# Patient Record
Sex: FEMALE | Race: WHITE | NOT HISPANIC OR LATINO | URBAN - METROPOLITAN AREA
[De-identification: names, ages, dates, MRNs, and addresses within clinical notes are randomized per-mention and may not be internally consistent; named-entity substitution may affect disease eponyms.]

---

## 2017-09-14 ENCOUNTER — OFFICE (AMBULATORY)
Dept: URBAN - METROPOLITAN AREA CLINIC 64 | Facility: CLINIC | Age: 32
End: 2017-09-14

## 2017-09-14 VITALS
HEIGHT: 69 IN | WEIGHT: 232 LBS | DIASTOLIC BLOOD PRESSURE: 86 MMHG | HEART RATE: 109 BPM | SYSTOLIC BLOOD PRESSURE: 121 MMHG

## 2017-09-14 DIAGNOSIS — R10.13 EPIGASTRIC PAIN: ICD-10-CM

## 2017-09-14 DIAGNOSIS — R10.12 LEFT UPPER QUADRANT PAIN: ICD-10-CM

## 2017-09-14 PROCEDURE — 99213 OFFICE O/P EST LOW 20 MIN: CPT

## 2017-10-04 ENCOUNTER — OFFICE (AMBULATORY)
Dept: URBAN - METROPOLITAN AREA CLINIC 64 | Facility: CLINIC | Age: 32
End: 2017-10-04
Payer: COMMERCIAL

## 2017-10-04 ENCOUNTER — ON CAMPUS - OUTPATIENT (AMBULATORY)
Dept: URBAN - METROPOLITAN AREA HOSPITAL 2 | Facility: HOSPITAL | Age: 32
End: 2017-10-04
Payer: COMMERCIAL

## 2017-10-04 VITALS
SYSTOLIC BLOOD PRESSURE: 124 MMHG | OXYGEN SATURATION: 94 % | SYSTOLIC BLOOD PRESSURE: 121 MMHG | OXYGEN SATURATION: 100 % | DIASTOLIC BLOOD PRESSURE: 55 MMHG | TEMPERATURE: 98.1 F | HEART RATE: 107 BPM | HEIGHT: 69 IN | HEART RATE: 86 BPM | DIASTOLIC BLOOD PRESSURE: 54 MMHG | SYSTOLIC BLOOD PRESSURE: 145 MMHG | DIASTOLIC BLOOD PRESSURE: 68 MMHG | OXYGEN SATURATION: 93 % | WEIGHT: 235 LBS | DIASTOLIC BLOOD PRESSURE: 84 MMHG | SYSTOLIC BLOOD PRESSURE: 118 MMHG | RESPIRATION RATE: 16 BRPM | HEART RATE: 89 BPM | OXYGEN SATURATION: 95 % | HEART RATE: 87 BPM

## 2017-10-04 DIAGNOSIS — K29.70 GASTRITIS, UNSPECIFIED, WITHOUT BLEEDING: ICD-10-CM

## 2017-10-04 DIAGNOSIS — K29.50 UNSPECIFIED CHRONIC GASTRITIS WITHOUT BLEEDING: ICD-10-CM

## 2017-10-04 DIAGNOSIS — R10.13 EPIGASTRIC PAIN: ICD-10-CM

## 2017-10-04 DIAGNOSIS — R10.12 LEFT UPPER QUADRANT PAIN: ICD-10-CM

## 2017-10-04 LAB
GI HISTOLOGY: A. SELECT: (no result)
GI HISTOLOGY: PDF REPORT: (no result)

## 2017-10-04 PROCEDURE — 43239 EGD BIOPSY SINGLE/MULTIPLE: CPT

## 2017-10-04 PROCEDURE — 88305 TISSUE EXAM BY PATHOLOGIST: CPT

## 2017-10-04 RX ADMIN — PROPOFOL: 10 INJECTION, EMULSION INTRAVENOUS at 13:43

## 2018-04-12 ENCOUNTER — OFFICE (AMBULATORY)
Dept: URBAN - METROPOLITAN AREA CLINIC 64 | Facility: CLINIC | Age: 33
End: 2018-04-12

## 2018-04-12 VITALS
HEIGHT: 69 IN | HEART RATE: 98 BPM | WEIGHT: 232 LBS | DIASTOLIC BLOOD PRESSURE: 81 MMHG | SYSTOLIC BLOOD PRESSURE: 115 MMHG

## 2018-04-12 DIAGNOSIS — R10.84 GENERALIZED ABDOMINAL PAIN: ICD-10-CM

## 2018-04-12 DIAGNOSIS — K59.00 CONSTIPATION, UNSPECIFIED: ICD-10-CM

## 2018-04-12 DIAGNOSIS — R19.4 CHANGE IN BOWEL HABIT: ICD-10-CM

## 2018-04-12 DIAGNOSIS — K62.5 HEMORRHAGE OF ANUS AND RECTUM: ICD-10-CM

## 2018-04-12 DIAGNOSIS — R11.2 NAUSEA WITH VOMITING, UNSPECIFIED: ICD-10-CM

## 2018-04-12 PROCEDURE — 99213 OFFICE O/P EST LOW 20 MIN: CPT | Performed by: NURSE PRACTITIONER

## 2018-04-12 RX ORDER — HYDROCORTISONE 25 MG/G
7.5 CREAM TOPICAL
Qty: 30 | Refills: 1 | Status: ACTIVE
Start: 2018-04-12

## 2019-04-12 ENCOUNTER — ON CAMPUS - OUTPATIENT (AMBULATORY)
Dept: URBAN - METROPOLITAN AREA HOSPITAL 77 | Facility: HOSPITAL | Age: 34
End: 2019-04-12

## 2019-04-12 DIAGNOSIS — R10.10 UPPER ABDOMINAL PAIN, UNSPECIFIED: ICD-10-CM

## 2019-04-12 DIAGNOSIS — D64.89 OTHER SPECIFIED ANEMIAS: ICD-10-CM

## 2019-04-12 DIAGNOSIS — R73.9 HYPERGLYCEMIA, UNSPECIFIED: ICD-10-CM

## 2019-04-12 DIAGNOSIS — R11.2 NAUSEA WITH VOMITING, UNSPECIFIED: ICD-10-CM

## 2019-04-12 DIAGNOSIS — K30 FUNCTIONAL DYSPEPSIA: ICD-10-CM

## 2019-04-12 DIAGNOSIS — K29.70 GASTRITIS, UNSPECIFIED, WITHOUT BLEEDING: ICD-10-CM

## 2019-04-12 PROCEDURE — 43239 EGD BIOPSY SINGLE/MULTIPLE: CPT | Performed by: INTERNAL MEDICINE

## 2019-04-12 PROCEDURE — 99213 OFFICE O/P EST LOW 20 MIN: CPT | Mod: 25 | Performed by: NURSE PRACTITIONER

## 2019-04-13 ENCOUNTER — ON CAMPUS - OUTPATIENT (AMBULATORY)
Dept: URBAN - METROPOLITAN AREA HOSPITAL 77 | Facility: HOSPITAL | Age: 34
End: 2019-04-13

## 2019-04-13 DIAGNOSIS — R73.9 HYPERGLYCEMIA, UNSPECIFIED: ICD-10-CM

## 2019-04-13 DIAGNOSIS — K30 FUNCTIONAL DYSPEPSIA: ICD-10-CM

## 2019-04-13 DIAGNOSIS — R11.2 NAUSEA WITH VOMITING, UNSPECIFIED: ICD-10-CM

## 2019-04-13 DIAGNOSIS — R10.10 UPPER ABDOMINAL PAIN, UNSPECIFIED: ICD-10-CM

## 2019-04-13 DIAGNOSIS — K29.70 GASTRITIS, UNSPECIFIED, WITHOUT BLEEDING: ICD-10-CM

## 2019-04-13 DIAGNOSIS — D64.89 OTHER SPECIFIED ANEMIAS: ICD-10-CM

## 2019-04-13 PROCEDURE — 99213 OFFICE O/P EST LOW 20 MIN: CPT | Performed by: NURSE PRACTITIONER

## 2019-06-17 ENCOUNTER — HOSPITAL ENCOUNTER (EMERGENCY)
Facility: HOSPITAL | Age: 34
Discharge: HOME | End: 2019-06-17
Attending: EMERGENCY MEDICINE | Admitting: EMERGENCY MEDICINE

## 2019-06-17 VITALS
WEIGHT: 225 LBS | HEART RATE: 88 BPM | TEMPERATURE: 98.3 F | HEIGHT: 69 IN | DIASTOLIC BLOOD PRESSURE: 87 MMHG | OXYGEN SATURATION: 100 % | BODY MASS INDEX: 33.33 KG/M2 | RESPIRATION RATE: 16 BRPM | SYSTOLIC BLOOD PRESSURE: 133 MMHG

## 2019-06-17 DIAGNOSIS — R00.2 PALPITATIONS: ICD-10-CM

## 2019-06-17 DIAGNOSIS — R10.33 PERIUMBILICAL ABDOMINAL PAIN: Primary | ICD-10-CM

## 2019-06-17 DIAGNOSIS — F41.8 ANXIETY ABOUT HEALTH: ICD-10-CM

## 2019-06-17 DIAGNOSIS — K50.90 CROHN'S DISEASE WITHOUT COMPLICATION, UNSPECIFIED GASTROINTESTINAL TRACT LOCATION (HCC): ICD-10-CM

## 2019-06-17 LAB
ALBUMIN SERPL-MCNC: 3.5 G/DL (ref 3.5–4.8)
ALBUMIN/GLOB SERPL: 1.1 G/DL (ref 1–1.7)
ALP SERPL-CCNC: 71 U/L (ref 32–91)
ALT SERPL W P-5'-P-CCNC: 25 U/L (ref 14–54)
ANION GAP SERPL CALCULATED.3IONS-SCNC: 9 MMOL/L (ref 10–20)
AST SERPL-CCNC: 36 U/L (ref 15–41)
B-HCG UR QL: NEGATIVE
BASOPHILS # BLD AUTO: 0.1 10*3/MM3 (ref 0–0.2)
BASOPHILS NFR BLD AUTO: 1 % (ref 0–1.5)
BILIRUB SERPL-MCNC: 0.3 MG/DL (ref 0.3–1.2)
BUN SERPL-MCNC: 8 MG/DL (ref 8–20)
BUN/CREAT SERPL: 11.4 (ref 5.4–26.2)
CALCIUM SPEC-SCNC: 9.2 MG/DL (ref 8.9–10.3)
CHLORIDE SERPL-SCNC: 111 MMOL/L (ref 101–111)
CO2 SERPL-SCNC: 18 MMOL/L (ref 22–32)
CREAT SERPL-MCNC: 0.7 MG/DL (ref 0.4–1)
DEPRECATED RDW RBC AUTO: 43.3 FL (ref 37–54)
EOSINOPHIL # BLD AUTO: 0.2 10*3/MM3 (ref 0–0.4)
EOSINOPHIL NFR BLD AUTO: 2.2 % (ref 0.3–6.2)
ERYTHROCYTE [DISTWIDTH] IN BLOOD BY AUTOMATED COUNT: 14.5 % (ref 12.3–15.4)
ERYTHROCYTE [SEDIMENTATION RATE] IN BLOOD: 10 MM/HR (ref 0–20)
GFR SERPL CREATININE-BSD FRML MDRD: 98 ML/MIN/1.73
GLOBULIN UR ELPH-MCNC: 3.2 GM/DL (ref 2.5–3.8)
GLUCOSE SERPL-MCNC: 126 MG/DL (ref 65–99)
HCT VFR BLD AUTO: 33.2 % (ref 34–46.6)
HGB BLD-MCNC: 11.3 G/DL (ref 12–15.9)
LYMPHOCYTES # BLD AUTO: 2.7 10*3/MM3 (ref 0.7–3.1)
LYMPHOCYTES NFR BLD AUTO: 36.3 % (ref 19.6–45.3)
MCH RBC QN AUTO: 29 PG (ref 26.6–33)
MCHC RBC AUTO-ENTMCNC: 34 G/DL (ref 31.5–35.7)
MCV RBC AUTO: 85.4 FL (ref 79–97)
MONOCYTES # BLD AUTO: 0.5 10*3/MM3 (ref 0.1–0.9)
MONOCYTES NFR BLD AUTO: 6.1 % (ref 5–12)
NEUTROPHILS NFR BLD AUTO: 4.1 10*3/MM3 (ref 1.7–7)
NEUTROPHILS NFR BLD AUTO: 54.4 % (ref 42.7–76)
NRBC BLD AUTO-RTO: 0.1 /100 WBC (ref 0–0.2)
PLATELET # BLD AUTO: 370 10*3/MM3 (ref 140–450)
PMV BLD AUTO: 7.5 FL (ref 6–12)
POTASSIUM SERPL-SCNC: 3.8 MMOL/L (ref 3.6–5.1)
PROT SERPL-MCNC: 6.7 G/DL (ref 6.1–7.9)
RBC # BLD AUTO: 3.89 10*6/MM3 (ref 3.77–5.28)
SODIUM SERPL-SCNC: 138 MMOL/L (ref 136–144)
TROPONIN I SERPL-MCNC: <0.03 NG/ML (ref 0–0.03)
TSH SERPL DL<=0.05 MIU/L-ACNC: 0.89 MIU/ML (ref 0.34–5.6)
WBC NRBC COR # BLD: 7.5 10*3/MM3 (ref 3.4–10.8)

## 2019-06-17 PROCEDURE — 81025 URINE PREGNANCY TEST: CPT | Performed by: EMERGENCY MEDICINE

## 2019-06-17 PROCEDURE — 85652 RBC SED RATE AUTOMATED: CPT | Performed by: EMERGENCY MEDICINE

## 2019-06-17 PROCEDURE — 85025 COMPLETE CBC W/AUTO DIFF WBC: CPT | Performed by: EMERGENCY MEDICINE

## 2019-06-17 PROCEDURE — 93005 ELECTROCARDIOGRAM TRACING: CPT | Performed by: EMERGENCY MEDICINE

## 2019-06-17 PROCEDURE — 96375 TX/PRO/DX INJ NEW DRUG ADDON: CPT

## 2019-06-17 PROCEDURE — 99284 EMERGENCY DEPT VISIT MOD MDM: CPT

## 2019-06-17 PROCEDURE — 84484 ASSAY OF TROPONIN QUANT: CPT | Performed by: EMERGENCY MEDICINE

## 2019-06-17 PROCEDURE — 25010000002 DIPHENHYDRAMINE PER 50 MG: Performed by: EMERGENCY MEDICINE

## 2019-06-17 PROCEDURE — 96374 THER/PROPH/DIAG INJ IV PUSH: CPT

## 2019-06-17 PROCEDURE — 25010000002 HALOPERIDOL LACTATE PER 5 MG: Performed by: EMERGENCY MEDICINE

## 2019-06-17 PROCEDURE — 80053 COMPREHEN METABOLIC PANEL: CPT | Performed by: EMERGENCY MEDICINE

## 2019-06-17 PROCEDURE — 84443 ASSAY THYROID STIM HORMONE: CPT | Performed by: EMERGENCY MEDICINE

## 2019-06-17 RX ORDER — PANTOPRAZOLE SODIUM 40 MG/1
40 TABLET, DELAYED RELEASE ORAL DAILY
COMMUNITY

## 2019-06-17 RX ORDER — GABAPENTIN 800 MG/1
800 TABLET ORAL 3 TIMES DAILY
COMMUNITY
End: 2020-11-30

## 2019-06-17 RX ORDER — DIPHENHYDRAMINE HYDROCHLORIDE 50 MG/ML
25 INJECTION INTRAMUSCULAR; INTRAVENOUS ONCE
Status: COMPLETED | OUTPATIENT
Start: 2019-06-17 | End: 2019-06-17

## 2019-06-17 RX ORDER — ALPRAZOLAM 0.5 MG/1
0.5 TABLET ORAL 3 TIMES DAILY PRN
COMMUNITY

## 2019-06-17 RX ORDER — TOPIRAMATE 50 MG/1
50 TABLET, FILM COATED ORAL 2 TIMES DAILY
COMMUNITY
End: 2020-11-30 | Stop reason: SDUPTHER

## 2019-06-17 RX ORDER — HALOPERIDOL 5 MG/ML
1 INJECTION INTRAMUSCULAR ONCE
Status: COMPLETED | OUTPATIENT
Start: 2019-06-17 | End: 2019-06-17

## 2019-06-17 RX ORDER — LEVETIRACETAM 750 MG/1
750 TABLET ORAL 3 TIMES DAILY
COMMUNITY
End: 2020-11-30 | Stop reason: SDUPTHER

## 2019-06-17 RX ORDER — ONDANSETRON 4 MG/1
4 TABLET, FILM COATED ORAL
COMMUNITY
End: 2020-11-30 | Stop reason: SDUPTHER

## 2019-06-17 RX ORDER — METOPROLOL TARTRATE 100 MG/1
100 TABLET ORAL 3 TIMES DAILY
COMMUNITY

## 2019-06-17 RX ORDER — CYCLOBENZAPRINE HCL 10 MG
10 TABLET ORAL 2 TIMES DAILY PRN
COMMUNITY

## 2019-06-17 RX ORDER — DILTIAZEM HYDROCHLORIDE 60 MG/1
120 TABLET, FILM COATED ORAL 2 TIMES DAILY
COMMUNITY

## 2019-06-17 RX ORDER — DULOXETIN HYDROCHLORIDE 60 MG/1
60 CAPSULE, DELAYED RELEASE ORAL 2 TIMES DAILY
COMMUNITY

## 2019-06-17 RX ORDER — BUSPIRONE HYDROCHLORIDE 5 MG/1
5 TABLET ORAL 2 TIMES DAILY
Qty: 60 TABLET | Refills: 0 | Status: SHIPPED | OUTPATIENT
Start: 2019-06-17 | End: 2020-11-30

## 2019-06-17 RX ADMIN — HALOPERIDOL LACTATE 1 MG: 5 INJECTION INTRAMUSCULAR at 10:01

## 2019-06-17 RX ADMIN — SODIUM CHLORIDE 1000 ML: 900 INJECTION, SOLUTION INTRAVENOUS at 10:01

## 2019-06-17 RX ADMIN — DIPHENHYDRAMINE HYDROCHLORIDE 25 MG: 50 INJECTION, SOLUTION INTRAMUSCULAR; INTRAVENOUS at 10:00

## 2019-12-29 ENCOUNTER — HOSPITAL ENCOUNTER (EMERGENCY)
Facility: HOSPITAL | Age: 34
Discharge: HOME | End: 2019-12-29
Admitting: EMERGENCY MEDICINE

## 2019-12-29 ENCOUNTER — APPOINTMENT (OUTPATIENT)
Dept: GENERAL RADIOLOGY | Facility: HOSPITAL | Age: 34
End: 2019-12-29

## 2019-12-29 ENCOUNTER — LAB REQUISITION (OUTPATIENT)
Dept: LAB | Facility: HOSPITAL | Age: 34
End: 2019-12-29

## 2019-12-29 VITALS
OXYGEN SATURATION: 98 % | WEIGHT: 238.54 LBS | HEART RATE: 92 BPM | DIASTOLIC BLOOD PRESSURE: 74 MMHG | SYSTOLIC BLOOD PRESSURE: 110 MMHG | BODY MASS INDEX: 35.33 KG/M2 | HEIGHT: 69 IN | RESPIRATION RATE: 16 BRPM | TEMPERATURE: 98.1 F

## 2019-12-29 DIAGNOSIS — S93.402A SPRAIN OF LEFT ANKLE, UNSPECIFIED LIGAMENT, INITIAL ENCOUNTER: ICD-10-CM
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
## 2019-12-29 DIAGNOSIS — S86.912A KNEE STRAIN, LEFT, INITIAL ENCOUNTER: ICD-10-CM

## 2019-12-29 DIAGNOSIS — Z04.2 ENCOUNTER FOR EXAMINATION AND OBSERVATION FOLLOWING WORK ACCIDENT: ICD-10-CM

## 2019-12-29 LAB
AMPHET+METHAMPHET UR QL: NEGATIVE
BARBITURATES UR QL SCN: NEGATIVE
BENZODIAZ UR QL SCN: POSITIVE
CANNABINOIDS SERPL QL: NEGATIVE
COCAINE UR QL: NEGATIVE
METHADONE UR QL SCN: NEGATIVE
OPIATES UR QL: NEGATIVE
OXYCODONE UR QL SCN: NEGATIVE

## 2019-12-29 PROCEDURE — 73630 X-RAY EXAM OF FOOT: CPT

## 2019-12-29 PROCEDURE — 80307 DRUG TEST PRSMV CHEM ANLYZR: CPT

## 2019-12-29 PROCEDURE — 73610 X-RAY EXAM OF ANKLE: CPT

## 2019-12-29 PROCEDURE — 73562 X-RAY EXAM OF KNEE 3: CPT

## 2019-12-29 PROCEDURE — 82570 ASSAY OF URINE CREATININE: CPT

## 2019-12-29 PROCEDURE — 99283 EMERGENCY DEPT VISIT LOW MDM: CPT

## 2019-12-29 RX ORDER — HYDROCODONE BITARTRATE AND ACETAMINOPHEN 5; 325 MG/1; MG/1
1 TABLET ORAL ONCE AS NEEDED
Status: DISCONTINUED | OUTPATIENT
Start: 2019-12-29 | End: 2019-12-29 | Stop reason: HOSPADM

## 2019-12-29 RX ADMIN — HYDROCODONE BITARTRATE AND ACETAMINOPHEN 1 TABLET: 5; 325 TABLET ORAL at 12:44

## 2020-11-30 ENCOUNTER — OFFICE VISIT (OUTPATIENT)
Dept: CARDIOLOGY | Facility: CLINIC | Age: 35
End: 2020-11-30

## 2020-11-30 VITALS
HEIGHT: 69 IN | SYSTOLIC BLOOD PRESSURE: 115 MMHG | OXYGEN SATURATION: 94 % | BODY MASS INDEX: 34.8 KG/M2 | DIASTOLIC BLOOD PRESSURE: 74 MMHG | WEIGHT: 235 LBS | HEART RATE: 82 BPM | TEMPERATURE: 96.9 F

## 2020-11-30 DIAGNOSIS — R07.2 PRECORDIAL PAIN: Primary | ICD-10-CM

## 2020-11-30 DIAGNOSIS — Z82.49 FAMILY HISTORY OF PREMATURE CAD: ICD-10-CM

## 2020-11-30 DIAGNOSIS — R06.09 DYSPNEA ON EXERTION: ICD-10-CM

## 2020-11-30 DIAGNOSIS — I10 ESSENTIAL HYPERTENSION: ICD-10-CM

## 2020-11-30 DIAGNOSIS — R42 DIZZINESS: ICD-10-CM

## 2020-11-30 DIAGNOSIS — R00.2 PALPITATIONS: ICD-10-CM

## 2020-11-30 DIAGNOSIS — E11.9 TYPE 2 DIABETES MELLITUS WITHOUT COMPLICATION, WITHOUT LONG-TERM CURRENT USE OF INSULIN (HCC): ICD-10-CM

## 2020-11-30 DIAGNOSIS — R60.0 EDEMA LEG: ICD-10-CM

## 2020-11-30 PROCEDURE — 99204 OFFICE O/P NEW MOD 45 MIN: CPT | Performed by: INTERNAL MEDICINE

## 2020-11-30 PROCEDURE — 93000 ELECTROCARDIOGRAM COMPLETE: CPT | Performed by: INTERNAL MEDICINE

## 2020-11-30 RX ORDER — SUMATRIPTAN 100 MG/1
TABLET, FILM COATED ORAL
COMMUNITY
Start: 2020-11-18

## 2020-11-30 RX ORDER — ONDANSETRON 8 MG/1
TABLET, ORALLY DISINTEGRATING ORAL
COMMUNITY
Start: 2020-11-10 | End: 2020-11-30 | Stop reason: SDUPTHER

## 2020-11-30 RX ORDER — FUROSEMIDE 40 MG/1
TABLET ORAL
COMMUNITY
Start: 2020-10-16

## 2020-11-30 RX ORDER — BUPROPION HYDROCHLORIDE 300 MG/1
300 TABLET ORAL DAILY
COMMUNITY

## 2020-11-30 RX ORDER — ONDANSETRON HYDROCHLORIDE 8 MG/1
TABLET, FILM COATED ORAL AS NEEDED
COMMUNITY
Start: 2020-09-08

## 2020-11-30 RX ORDER — MONTELUKAST SODIUM 10 MG/1
TABLET ORAL
COMMUNITY
Start: 2020-11-18

## 2020-11-30 RX ORDER — LEVETIRACETAM 500 MG/1
TABLET ORAL 3 TIMES DAILY
COMMUNITY
Start: 2020-11-10

## 2020-11-30 RX ORDER — MESALAMINE 1.2 G/1
TABLET, DELAYED RELEASE ORAL
COMMUNITY
Start: 2020-09-11

## 2020-11-30 RX ORDER — GABAPENTIN 300 MG/1
300 CAPSULE ORAL 2 TIMES DAILY
COMMUNITY
Start: 2020-11-10

## 2020-11-30 RX ORDER — CETIRIZINE HYDROCHLORIDE 10 MG/1
TABLET ORAL
COMMUNITY
Start: 2020-11-18

## 2020-11-30 RX ORDER — LAMOTRIGINE 100 MG/1
TABLET ORAL
COMMUNITY
Start: 2020-11-18

## 2020-11-30 RX ORDER — PROMETHAZINE HYDROCHLORIDE 25 MG/1
SUPPOSITORY RECTAL
COMMUNITY
Start: 2020-09-15

## 2023-07-23 ENCOUNTER — APPOINTMENT (OUTPATIENT)
Dept: GENERAL RADIOLOGY | Facility: HOSPITAL | Age: 38
End: 2023-07-23
Payer: COMMERCIAL

## 2023-07-23 ENCOUNTER — APPOINTMENT (OUTPATIENT)
Dept: CARDIOLOGY | Facility: HOSPITAL | Age: 38
End: 2023-07-23
Payer: COMMERCIAL

## 2023-07-23 ENCOUNTER — HOSPITAL ENCOUNTER (OUTPATIENT)
Facility: HOSPITAL | Age: 38
Setting detail: OBSERVATION
Discharge: HOME | End: 2023-07-24
Attending: EMERGENCY MEDICINE | Admitting: EMERGENCY MEDICINE
Payer: COMMERCIAL

## 2023-07-23 DIAGNOSIS — R00.2 PALPITATIONS: ICD-10-CM

## 2023-07-23 DIAGNOSIS — R07.9 CHEST PAIN, UNSPECIFIED TYPE: Primary | ICD-10-CM

## 2023-07-23 LAB
ALBUMIN SERPL-MCNC: 4.3 G/DL (ref 3.5–5.2)
ALBUMIN/GLOB SERPL: 1.6 G/DL
ALP SERPL-CCNC: 143 U/L (ref 39–117)
ALT SERPL W P-5'-P-CCNC: 24 U/L (ref 1–33)
ANION GAP SERPL CALCULATED.3IONS-SCNC: 12 MMOL/L (ref 5–15)
AST SERPL-CCNC: 18 U/L (ref 1–32)
BASOPHILS # BLD AUTO: 0.1 10*3/MM3 (ref 0–0.2)
BASOPHILS NFR BLD AUTO: 0.7 % (ref 0–1.5)
BILIRUB SERPL-MCNC: <0.2 MG/DL (ref 0–1.2)
BUN SERPL-MCNC: 11 MG/DL (ref 6–20)
BUN/CREAT SERPL: 14.9 (ref 7–25)
CALCIUM SPEC-SCNC: 9.6 MG/DL (ref 8.6–10.5)
CHLORIDE SERPL-SCNC: 107 MMOL/L (ref 98–107)
CO2 SERPL-SCNC: 23 MMOL/L (ref 22–29)
CREAT SERPL-MCNC: 0.74 MG/DL (ref 0.57–1)
CRP SERPL-MCNC: 1.38 MG/DL (ref 0–0.5)
D DIMER PPP FEU-MCNC: 0.3 MG/L (FEU) (ref 0–0.5)
DEPRECATED RDW RBC AUTO: 45.1 FL (ref 37–54)
EGFRCR SERPLBLD CKD-EPI 2021: 107.7 ML/MIN/1.73
EOSINOPHIL # BLD AUTO: 0.3 10*3/MM3 (ref 0–0.4)
EOSINOPHIL NFR BLD AUTO: 2.8 % (ref 0.3–6.2)
ERYTHROCYTE [DISTWIDTH] IN BLOOD BY AUTOMATED COUNT: 14.8 % (ref 12.3–15.4)
GEN 5 2HR TROPONIN T REFLEX: 12 NG/L
GLOBULIN UR ELPH-MCNC: 2.7 GM/DL
GLUCOSE SERPL-MCNC: 212 MG/DL (ref 65–99)
HCT VFR BLD AUTO: 39.7 % (ref 34–46.6)
HGB BLD-MCNC: 13.1 G/DL (ref 12–15.9)
LYMPHOCYTES # BLD AUTO: 2.4 10*3/MM3 (ref 0.7–3.1)
LYMPHOCYTES NFR BLD AUTO: 25.1 % (ref 19.6–45.3)
MCH RBC QN AUTO: 28.8 PG (ref 26.6–33)
MCHC RBC AUTO-ENTMCNC: 33 G/DL (ref 31.5–35.7)
MCV RBC AUTO: 87.4 FL (ref 79–97)
MONOCYTES # BLD AUTO: 0.4 10*3/MM3 (ref 0.1–0.9)
MONOCYTES NFR BLD AUTO: 4.2 % (ref 5–12)
NEUTROPHILS NFR BLD AUTO: 6.5 10*3/MM3 (ref 1.7–7)
NEUTROPHILS NFR BLD AUTO: 67.2 % (ref 42.7–76)
NRBC BLD AUTO-RTO: 0 /100 WBC (ref 0–0.2)
PLATELET # BLD AUTO: 399 10*3/MM3 (ref 140–450)
PMV BLD AUTO: 7.5 FL (ref 6–12)
POTASSIUM SERPL-SCNC: 3.6 MMOL/L (ref 3.5–5.2)
PROT SERPL-MCNC: 7 G/DL (ref 6–8.5)
RBC # BLD AUTO: 4.55 10*6/MM3 (ref 3.77–5.28)
SODIUM SERPL-SCNC: 142 MMOL/L (ref 136–145)
TROPONIN T DELTA: ABNORMAL
TROPONIN T SERPL HS-MCNC: <6 NG/L
WBC NRBC COR # BLD: 9.7 10*3/MM3 (ref 3.4–10.8)

## 2023-07-23 PROCEDURE — 86140 C-REACTIVE PROTEIN: CPT | Performed by: NURSE PRACTITIONER

## 2023-07-23 PROCEDURE — 80053 COMPREHEN METABOLIC PANEL: CPT | Performed by: EMERGENCY MEDICINE

## 2023-07-23 PROCEDURE — 96374 THER/PROPH/DIAG INJ IV PUSH: CPT

## 2023-07-23 PROCEDURE — 36415 COLL VENOUS BLD VENIPUNCTURE: CPT | Performed by: EMERGENCY MEDICINE

## 2023-07-23 PROCEDURE — 25010000002 ENOXAPARIN PER 10 MG: Performed by: NURSE PRACTITIONER

## 2023-07-23 PROCEDURE — G0378 HOSPITAL OBSERVATION PER HR: HCPCS

## 2023-07-23 PROCEDURE — 85025 COMPLETE CBC W/AUTO DIFF WBC: CPT | Performed by: EMERGENCY MEDICINE

## 2023-07-23 PROCEDURE — 85379 FIBRIN DEGRADATION QUANT: CPT | Performed by: EMERGENCY MEDICINE

## 2023-07-23 PROCEDURE — 63710000001 PROMETHAZINE PER 25 MG: Performed by: EMERGENCY MEDICINE

## 2023-07-23 PROCEDURE — 96375 TX/PRO/DX INJ NEW DRUG ADDON: CPT

## 2023-07-23 PROCEDURE — 93005 ELECTROCARDIOGRAM TRACING: CPT

## 2023-07-23 PROCEDURE — 96376 TX/PRO/DX INJ SAME DRUG ADON: CPT

## 2023-07-23 PROCEDURE — 25010000002 KETOROLAC TROMETHAMINE PER 15 MG: Performed by: NURSE PRACTITIONER

## 2023-07-23 PROCEDURE — 99285 EMERGENCY DEPT VISIT HI MDM: CPT

## 2023-07-23 PROCEDURE — 71045 X-RAY EXAM CHEST 1 VIEW: CPT

## 2023-07-23 PROCEDURE — 25010000002 ONDANSETRON PER 1 MG: Performed by: NURSE PRACTITIONER

## 2023-07-23 PROCEDURE — 99213 OFFICE O/P EST LOW 20 MIN: CPT | Performed by: INTERNAL MEDICINE

## 2023-07-23 PROCEDURE — 36415 COLL VENOUS BLD VENIPUNCTURE: CPT

## 2023-07-23 PROCEDURE — 96372 THER/PROPH/DIAG INJ SC/IM: CPT

## 2023-07-23 PROCEDURE — 63710000001 ONDANSETRON PER 8 MG: Performed by: NURSE PRACTITIONER

## 2023-07-23 PROCEDURE — 25010000002 HYDROMORPHONE 1 MG/ML SOLUTION: Performed by: EMERGENCY MEDICINE

## 2023-07-23 PROCEDURE — 84484 ASSAY OF TROPONIN QUANT: CPT | Performed by: EMERGENCY MEDICINE

## 2023-07-23 PROCEDURE — 25010000002 ONDANSETRON PER 1 MG: Performed by: EMERGENCY MEDICINE

## 2023-07-23 RX ORDER — TRAZODONE HYDROCHLORIDE 100 MG/1
100 TABLET ORAL NIGHTLY
Status: DISCONTINUED | OUTPATIENT
Start: 2023-07-23 | End: 2023-07-24 | Stop reason: HOSPADM

## 2023-07-23 RX ORDER — ENOXAPARIN SODIUM 100 MG/ML
40 INJECTION SUBCUTANEOUS DAILY
Status: DISCONTINUED | OUTPATIENT
Start: 2023-07-23 | End: 2023-07-23

## 2023-07-23 RX ORDER — POLYETHYLENE GLYCOL 3350 17 G/17G
17 POWDER, FOR SOLUTION ORAL DAILY PRN
Status: DISCONTINUED | OUTPATIENT
Start: 2023-07-23 | End: 2023-07-24 | Stop reason: HOSPADM

## 2023-07-23 RX ORDER — ONDANSETRON 2 MG/ML
4 INJECTION INTRAMUSCULAR; INTRAVENOUS EVERY 6 HOURS PRN
Status: DISCONTINUED | OUTPATIENT
Start: 2023-07-23 | End: 2023-07-24 | Stop reason: HOSPADM

## 2023-07-23 RX ORDER — SODIUM CHLORIDE 0.9 % (FLUSH) 0.9 %
10 SYRINGE (ML) INJECTION EVERY 12 HOURS SCHEDULED
Status: DISCONTINUED | OUTPATIENT
Start: 2023-07-23 | End: 2023-07-24 | Stop reason: HOSPADM

## 2023-07-23 RX ORDER — TOPIRAMATE 100 MG/1
200 TABLET, FILM COATED ORAL NIGHTLY
Status: DISCONTINUED | OUTPATIENT
Start: 2023-07-23 | End: 2023-07-24 | Stop reason: HOSPADM

## 2023-07-23 RX ORDER — PANTOPRAZOLE SODIUM 40 MG/1
40 TABLET, DELAYED RELEASE ORAL DAILY
Status: DISCONTINUED | OUTPATIENT
Start: 2023-07-23 | End: 2023-07-24 | Stop reason: HOSPADM

## 2023-07-23 RX ORDER — CETIRIZINE HYDROCHLORIDE 10 MG/1
10 TABLET ORAL DAILY
Status: DISCONTINUED | OUTPATIENT
Start: 2023-07-23 | End: 2023-07-24 | Stop reason: HOSPADM

## 2023-07-23 RX ORDER — METOPROLOL TARTRATE 50 MG/1
100 TABLET, FILM COATED ORAL 3 TIMES DAILY
Status: DISCONTINUED | OUTPATIENT
Start: 2023-07-23 | End: 2023-07-24 | Stop reason: HOSPADM

## 2023-07-23 RX ORDER — GABAPENTIN 300 MG/1
300 CAPSULE ORAL EVERY 12 HOURS SCHEDULED
Status: DISCONTINUED | OUTPATIENT
Start: 2023-07-23 | End: 2023-07-24 | Stop reason: HOSPADM

## 2023-07-23 RX ORDER — ALPRAZOLAM 0.5 MG/1
0.5 TABLET ORAL 4 TIMES DAILY PRN
Status: DISCONTINUED | OUTPATIENT
Start: 2023-07-23 | End: 2023-07-24 | Stop reason: HOSPADM

## 2023-07-23 RX ORDER — ONDANSETRON 2 MG/ML
4 INJECTION INTRAMUSCULAR; INTRAVENOUS ONCE
Status: COMPLETED | OUTPATIENT
Start: 2023-07-23 | End: 2023-07-23

## 2023-07-23 RX ORDER — BISACODYL 10 MG
10 SUPPOSITORY, RECTAL RECTAL DAILY PRN
Status: DISCONTINUED | OUTPATIENT
Start: 2023-07-23 | End: 2023-07-24 | Stop reason: HOSPADM

## 2023-07-23 RX ORDER — PREDNISONE 20 MG/1
20 TABLET ORAL DAILY
Status: DISCONTINUED | OUTPATIENT
Start: 2023-07-25 | End: 2023-07-23

## 2023-07-23 RX ORDER — ONDANSETRON 4 MG/1
4 TABLET, FILM COATED ORAL EVERY 6 HOURS PRN
Status: DISCONTINUED | OUTPATIENT
Start: 2023-07-23 | End: 2023-07-24 | Stop reason: HOSPADM

## 2023-07-23 RX ORDER — METOPROLOL TARTRATE 5 MG/5ML
5 INJECTION INTRAVENOUS ONCE
Status: COMPLETED | OUTPATIENT
Start: 2023-07-23 | End: 2023-07-23

## 2023-07-23 RX ORDER — DULOXETIN HYDROCHLORIDE 30 MG/1
60 CAPSULE, DELAYED RELEASE ORAL 2 TIMES DAILY
Status: DISCONTINUED | OUTPATIENT
Start: 2023-07-23 | End: 2023-07-24 | Stop reason: HOSPADM

## 2023-07-23 RX ORDER — SODIUM CHLORIDE 0.9 % (FLUSH) 0.9 %
10 SYRINGE (ML) INJECTION AS NEEDED
Status: DISCONTINUED | OUTPATIENT
Start: 2023-07-23 | End: 2023-07-24 | Stop reason: HOSPADM

## 2023-07-23 RX ORDER — BUDESONIDE 3 MG/1
9 CAPSULE, COATED PELLETS ORAL DAILY
Status: DISCONTINUED | OUTPATIENT
Start: 2023-07-24 | End: 2023-07-24 | Stop reason: HOSPADM

## 2023-07-23 RX ORDER — ACETAMINOPHEN 325 MG/1
650 TABLET ORAL EVERY 4 HOURS PRN
Status: DISCONTINUED | OUTPATIENT
Start: 2023-07-23 | End: 2023-07-24 | Stop reason: HOSPADM

## 2023-07-23 RX ORDER — BISACODYL 5 MG/1
5 TABLET, DELAYED RELEASE ORAL DAILY PRN
Status: DISCONTINUED | OUTPATIENT
Start: 2023-07-23 | End: 2023-07-24 | Stop reason: HOSPADM

## 2023-07-23 RX ORDER — KETOROLAC TROMETHAMINE 15 MG/ML
15 INJECTION, SOLUTION INTRAMUSCULAR; INTRAVENOUS EVERY 6 HOURS PRN
Status: DISCONTINUED | OUTPATIENT
Start: 2023-07-23 | End: 2023-07-24 | Stop reason: HOSPADM

## 2023-07-23 RX ORDER — DILTIAZEM HYDROCHLORIDE 60 MG/1
120 TABLET, FILM COATED ORAL 2 TIMES DAILY
Status: DISCONTINUED | OUTPATIENT
Start: 2023-07-23 | End: 2023-07-24 | Stop reason: HOSPADM

## 2023-07-23 RX ORDER — SODIUM CHLORIDE 9 MG/ML
40 INJECTION, SOLUTION INTRAVENOUS AS NEEDED
Status: DISCONTINUED | OUTPATIENT
Start: 2023-07-23 | End: 2023-07-24 | Stop reason: HOSPADM

## 2023-07-23 RX ORDER — AMOXICILLIN 250 MG
2 CAPSULE ORAL 2 TIMES DAILY
Status: DISCONTINUED | OUTPATIENT
Start: 2023-07-23 | End: 2023-07-24 | Stop reason: HOSPADM

## 2023-07-23 RX ORDER — PROMETHAZINE HYDROCHLORIDE 25 MG/1
25 TABLET ORAL ONCE
Status: COMPLETED | OUTPATIENT
Start: 2023-07-23 | End: 2023-07-23

## 2023-07-23 RX ORDER — LEVETIRACETAM 500 MG/1
500 TABLET ORAL 3 TIMES DAILY
Status: DISCONTINUED | OUTPATIENT
Start: 2023-07-23 | End: 2023-07-24 | Stop reason: HOSPADM

## 2023-07-23 RX ORDER — PREDNISONE 10 MG/1
10 TABLET ORAL DAILY
Status: DISCONTINUED | OUTPATIENT
Start: 2023-07-27 | End: 2023-07-23

## 2023-07-23 RX ORDER — SUMATRIPTAN 50 MG/1
100 TABLET, FILM COATED ORAL ONCE AS NEEDED
Status: DISCONTINUED | OUTPATIENT
Start: 2023-07-23 | End: 2023-07-24 | Stop reason: HOSPADM

## 2023-07-23 RX ORDER — FAMOTIDINE 10 MG/ML
20 INJECTION, SOLUTION INTRAVENOUS ONCE
Status: COMPLETED | OUTPATIENT
Start: 2023-07-23 | End: 2023-07-23

## 2023-07-23 RX ORDER — CYCLOBENZAPRINE HCL 10 MG
10 TABLET ORAL 2 TIMES DAILY PRN
Status: DISCONTINUED | OUTPATIENT
Start: 2023-07-23 | End: 2023-07-24 | Stop reason: HOSPADM

## 2023-07-23 RX ADMIN — METOPROLOL TARTRATE 100 MG: 50 TABLET ORAL at 15:09

## 2023-07-23 RX ADMIN — ONDANSETRON 4 MG: 2 INJECTION INTRAMUSCULAR; INTRAVENOUS at 15:42

## 2023-07-23 RX ADMIN — METOPROLOL TARTRATE 5 MG: 1 INJECTION, SOLUTION INTRAVENOUS at 08:25

## 2023-07-23 RX ADMIN — DULOXETINE HYDROCHLORIDE 60 MG: 30 CAPSULE, DELAYED RELEASE ORAL at 22:27

## 2023-07-23 RX ADMIN — KETOROLAC TROMETHAMINE 15 MG: 15 INJECTION, SOLUTION INTRAMUSCULAR; INTRAVENOUS at 15:38

## 2023-07-23 RX ADMIN — LEVETIRACETAM 500 MG: 500 TABLET, FILM COATED ORAL at 22:27

## 2023-07-23 RX ADMIN — TOPIRAMATE 200 MG: 100 TABLET, FILM COATED ORAL at 22:28

## 2023-07-23 RX ADMIN — PROMETHAZINE HYDROCHLORIDE 25 MG: 25 TABLET ORAL at 10:39

## 2023-07-23 RX ADMIN — ALPRAZOLAM 0.5 MG: 0.5 TABLET ORAL at 22:28

## 2023-07-23 RX ADMIN — ONDANSETRON HYDROCHLORIDE 4 MG: 4 TABLET, FILM COATED ORAL at 23:16

## 2023-07-23 RX ADMIN — ONDANSETRON 4 MG: 2 INJECTION INTRAMUSCULAR; INTRAVENOUS at 08:26

## 2023-07-23 RX ADMIN — FAMOTIDINE 20 MG: 10 INJECTION INTRAVENOUS at 08:25

## 2023-07-23 RX ADMIN — GABAPENTIN 300 MG: 300 CAPSULE ORAL at 22:28

## 2023-07-23 RX ADMIN — CETIRIZINE HYDROCHLORIDE 10 MG: 10 TABLET, FILM COATED ORAL at 15:09

## 2023-07-23 RX ADMIN — CYCLOBENZAPRINE 10 MG: 10 TABLET, FILM COATED ORAL at 23:16

## 2023-07-23 RX ADMIN — TRAZODONE HYDROCHLORIDE 100 MG: 100 TABLET ORAL at 22:28

## 2023-07-23 RX ADMIN — HYDROMORPHONE HYDROCHLORIDE 0.5 MG: 1 INJECTION, SOLUTION INTRAMUSCULAR; INTRAVENOUS; SUBCUTANEOUS at 08:25

## 2023-07-23 RX ADMIN — LEVETIRACETAM 500 MG: 500 TABLET, FILM COATED ORAL at 15:09

## 2023-07-23 RX ADMIN — DILTIAZEM HYDROCHLORIDE 120 MG: 60 TABLET, FILM COATED ORAL at 22:27

## 2023-07-23 RX ADMIN — SENNOSIDES AND DOCUSATE SODIUM 2 TABLET: 50; 8.6 TABLET ORAL at 22:28

## 2023-07-23 RX ADMIN — ENOXAPARIN SODIUM 40 MG: 100 INJECTION SUBCUTANEOUS at 15:09

## 2023-07-23 RX ADMIN — PANTOPRAZOLE SODIUM 40 MG: 40 TABLET, DELAYED RELEASE ORAL at 15:09

## 2023-07-23 RX ADMIN — METOPROLOL TARTRATE 100 MG: 50 TABLET ORAL at 22:27

## 2023-07-23 RX ADMIN — Medication 10 ML: at 22:29

## 2023-07-23 NOTE — LETTER
July 24, 2023     Patient: Cherie Louis   YOB: 1985   Date of Visit: 7/23/2023        To Whom It May Concern:    It is my medical opinion that Cherie Louis May return to work on Wednesday 7/26/2023 and has been under care since 07/23/2023.

## 2023-07-23 NOTE — ED NOTES
Nursing report ED to floor  Cherie Louis  38 y.o.  female    HPI:   Chief Complaint   Patient presents with    Chest Pain       Admitting doctor:   Judd Vernon MD    Admitting diagnosis:   The primary encounter diagnosis was Chest pain, unspecified type. A diagnosis of Palpitations was also pertinent to this visit.    Code status:   Current Code Status       Date Active Code Status Order ID Comments User Context       Not on file            Allergies:   Ativan [lorazepam], Compazine [prochlorperazine edisylate], and Haldol [haloperidol]    Isolation:  No active isolations     Fall Risk:  Fall Risk Assessment was completed, and patient is at low risk for falls.   Predictive Model Details         2 (Low) Factor Value    Calculated 7/23/2023 10:07 Musculoskeletal Assessment WDL    Risk of Fall Model Age 38     Active Peripheral IV Present     Imaging order in this encounter Present     Respiratory Rate 22     Skin Assessment WDL     Magnesium not on file     Number of Distinct Medication Classes administered 4     Diastolic BP 67     Drug Use No     Franky Scale not on file     Financial Class Private Insurance     Peripheral Vascular Assessment WDL     Cardiac Assessment X     Number of administrations of Ulcer Drugs 1     Gastrointestinal Assessment WDL     Number of administrations of Analgesic Narcotics 1     Chloride 107 mmol/L     ALT 24 U/L     Total Bilirubin <0.2 mg/dL     Albumin 4.3 g/dL     Days after Admission 0.104     Calcium 9.6 mg/dL     Creatinine 0.74 mg/dL     Potassium 3.6 mmol/L         Weight:       07/23/23  0736   Weight: 112 kg (247 lb)       Intake and Output  No intake or output data in the 24 hours ending 07/23/23 1312    Diet:        Most recent vitals:   Vitals:    07/23/23 1100 07/23/23 1101 07/23/23 1131 07/23/23 1201   BP:  98/63 103/72 91/63   Pulse: 90 98 94 86   Resp:       Temp:       TempSrc:       SpO2: 96% 96% 97% 96%   Weight:       Height:           Active LDAs/IV  Access:   Lines, Drains & Airways       Active LDAs       Name Placement date Placement time Site Days    Peripheral IV Anterior;Proximal;Right Forearm --  --  Forearm  --                    Skin Condition:   Skin Assessments (last day)       Date/Time Skin WDL    07/23/23 08:17:09 WD             Labs (abnormal labs have a star):   Labs Reviewed   COMPREHENSIVE METABOLIC PANEL - Abnormal; Notable for the following components:       Result Value    Glucose 212 (*)     Alkaline Phosphatase 143 (*)     All other components within normal limits    Narrative:     GFR Normal >60  Chronic Kidney Disease <60  Kidney Failure <15     CBC WITH AUTO DIFFERENTIAL - Abnormal; Notable for the following components:    Monocyte % 4.2 (*)     All other components within normal limits   HIGH SENSITIVITIY TROPONIN T 2HR - Abnormal; Notable for the following components:    HS Troponin T 12 (*)     All other components within normal limits    Narrative:     High Sensitive Troponin T Reference Range:  <10.0 ng/L- Negative Female for AMI  <15.0 ng/L- Negative Male for AMI  >=10 - Abnormal Female indicating possible myocardial injury.  >=15 - Abnormal Male indicating possible myocardial injury.   Clinicians would have to utilize clinical acumen, EKG, Troponin, and serial changes to determine if it is an Acute Myocardial Infarction or myocardial injury due to an underlying chronic condition.        D-DIMER, QUANTITATIVE - Normal    Narrative:     According to the assay 's published package insert, a normal (<0.50 mg/L (FEU)) D-dimer result in conjunction with a non-high clinical probability assessment, excludes deep vein thrombosis (DVT) and pulmonary embolism (PE) with high sensitivity.    D-dimer values increase with age and this can make VTE exclusion of an older population difficult. To address this, the [employer], based on best available evidence and recent guidelines, recommends that clinicians use  "age-adjusted D-dimer thresholds in patients greater than 50 years of age with: a) a low probability of PE who do not meet all Pulmonary Embolism Rule Out Criteria, or b) in those with intermediate probability of PE.   The formula for an age-adjusted D-dimer cut-off is \"age/100\".  For example, a 60 year old patient would have an age-adjusted cut-off of 0.60 mg/L (FEU) and an 80 year old 0.80 mg/L (FEU).   TROPONIN - Normal    Narrative:     High Sensitive Troponin T Reference Range:  <10.0 ng/L- Negative Female for AMI  <15.0 ng/L- Negative Male for AMI  >=10 - Abnormal Female indicating possible myocardial injury.  >=15 - Abnormal Male indicating possible myocardial injury.   Clinicians would have to utilize clinical acumen, EKG, Troponin, and serial changes to determine if it is an Acute Myocardial Infarction or myocardial injury due to an underlying chronic condition.        CBC AND DIFFERENTIAL    Narrative:     The following orders were created for panel order CBC & Differential.  Procedure                               Abnormality         Status                     ---------                               -----------         ------                     CBC Auto Differential[[id number]]        Abnormal            Final result                 Please view results for these tests on the individual orders.       LOC: Person, Place, Time, and Situation    Telemetry:  Observation Unit    Cardiac Monitoring Ordered: yes    EKG:   ECG 12 Lead Chest Pain   Preliminary Result   HEART RATE= 122  bpm   RR Interval= 492  ms   WV Interval= 147  ms   P Horizontal Axis= 17  deg   P Front Axis= 61  deg   QRSD Interval= 80  ms   QT Interval=   ms   QRS Axis= 35  deg   T Wave Axis=   deg   - ABNORMAL ECG -   Sinus tachycardia   Atrial premature complex   Nonspecific T abnrm, anterolateral leads   Electronically Signed By:    Date and Time of Study: 2023-07-23 07:45:20          Medications Given in the ED:   Medications   famotidine " (PEPCID) injection 20 mg (20 mg Intravenous Given 7/23/23 0825)   ondansetron (ZOFRAN) injection 4 mg (4 mg Intravenous Given 7/23/23 0826)   HYDROmorphone (DILAUDID) injection 0.5 mg (0.5 mg Intravenous Given 7/23/23 0825)   metoprolol tartrate (LOPRESSOR) injection 5 mg (5 mg Intravenous Given 7/23/23 0825)   promethazine (PHENERGAN) tablet 25 mg (25 mg Oral Given 7/23/23 1039)       Imaging results:  XR Chest 1 View    Result Date: 7/23/2023  Impression: 1.No acute radiographic abnormality is identified. Electronically Signed: Darrian Arambula  7/23/2023 8:48 AM EDT  Workstation ID: [id number]     Social issues:   Social History     Socioeconomic History    Marital status:    Tobacco Use    Smoking status: Never    Smokeless tobacco: Never   Substance and Sexual Activity    Alcohol use: Not Currently     Alcohol/week: 1.0 standard drink     Types: 1 Glasses of wine per week     Comment: yearly    Drug use: No    Sexual activity: Defer       NIH Stroke Scale:  Interval: (not recorded)  1a. Level of Consciousness: (not recorded)  1b. LOC Questions: (not recorded)  1c. LOC Commands: (not recorded)  2. Best Gaze: (not recorded)  3. Visual: (not recorded)  4. Facial Palsy: (not recorded)  5a. Motor Arm, Left: (not recorded)  5b. Motor Arm, Right: (not recorded)  6a. Motor Leg, Left: (not recorded)  6b. Motor Leg, Right: (not recorded)  7. Limb Ataxia: (not recorded)  8. Sensory: (not recorded)  9. Best Language: (not recorded)  10. Dysarthria: (not recorded)  11. Extinction and Inattention (formerly Neglect): (not recorded)    Total (NIH Stroke Scale): (not recorded)     Additional notable assessment information:     Nursing report ED to floor:  Mena Manuel RN   07/23/23 13:12 EDT

## 2023-07-23 NOTE — PLAN OF CARE
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 Call light in reach and clean and dry.

## 2023-07-23 NOTE — ED PROVIDER NOTES
"Subjective   History of Present Illness  38-year-old female presents with complaints of chest pain.  She states that started around 645 this morning.  She states it was very severe.  She states put a pulse oximeter on her and her heart rate went up to 212.  She has some radiation of pain to her shoulder.  She states pain is better now than it was but is not resolved she has had some sweats.  She states she had some nausea when she woke up around 445 but this is not unusual as she has Crohn's disease.  She had taken Zofran for this.  She reports no fever or cough.  She does not complain of thoracic back pain.  She does complain of some lower back pain.     Review of Systems    Past Medical History:   Diagnosis Date    Atrial fibrillation     CHF (congestive heart failure)     COVID-19     Crohn's disease 2013    Epilepsy     Head injury     Hypotension     Migraines     Palpitations     PTSD (post-traumatic stress disorder)        Allergies   Allergen Reactions    Ativan [Lorazepam] Anaphylaxis    Compazine [Prochlorperazine Edisylate] Anaphylaxis    Haldol [Haloperidol] Shortness Of Breath and Other (See Comments)     Pt states \"It made me feel like I had a 3rd degree sunburn\"       Past Surgical History:   Procedure Laterality Date    ABDOMINAL SURGERY       SECTION      LAPAROTOMY OOPHERECTOMY Right        Family History   Problem Relation Age of Onset    Heart attack Mother     Lupus Mother     Heart attack Maternal Grandmother     Heart attack Maternal Grandfather        Social History     Socioeconomic History    Marital status:    Tobacco Use    Smoking status: Never    Smokeless tobacco: Never   Substance and Sexual Activity    Alcohol use: Not Currently     Alcohol/week: 1.0 standard drink     Types: 1 Glasses of wine per week     Comment: yearly    Drug use: No    Sexual activity: Defer     Prior to Admission medications    Medication Sig Start Date End Date Taking? Authorizing Provider "   ALPRAZolam (XANAX) 0.5 MG tablet Take 1 tablet by mouth 4 (Four) Times a Day As Needed for Anxiety.    Justine Whitaker MD   cetirizine (zyrTEC) 10 MG tablet Take 1 tablet by mouth Daily. 11/18/20   Justine Whitaker MD   cyclobenzaprine (FLEXERIL) 10 MG tablet Take 1 tablet by mouth 2 (Two) Times a Day As Needed for Muscle Spasms.    Justine Whitaker MD   diltiaZEM (CARDIZEM) 60 MG tablet Take 2 tablets by mouth 2 (Two) Times a Day.    Justine Whitaker MD   DULoxetine (CYMBALTA) 60 MG capsule Take 1 capsule by mouth 2 (Two) Times a Day.    Justine Whitaker MD   gabapentin (NEURONTIN) 300 MG capsule Take 1 capsule by mouth 2 (two) times a day. 11/10/20   Justine Whitaker MD   levETIRAcetam (KEPPRA) 500 MG tablet Take 1 tablet by mouth 3 (Three) Times a Day. 11/10/20   Justine Whitaker MD   metoprolol tartrate (LOPRESSOR) 100 MG tablet Take 1 tablet by mouth 3 (Three) Times a Day.    Justine Whitaker MD   ondansetron ODT (ZOFRAN-ODT) 8 MG disintegrating tablet Place 1 tablet on the tongue Every 4 (Four) Hours As Needed for Nausea or Vomiting.    Justine Whitaker MD   pantoprazole (PROTONIX) 40 MG EC tablet Take 1 tablet by mouth Daily.    Justine Whitaker MD   promethazine (PHENERGAN) 25 MG tablet Take 1 tablet by mouth 2 (Two) Times a Day As Needed for Nausea or Vomiting.    Justine Whitaker MD   Promethegan 25 MG suppository Insert 1 suppository into the rectum Every 6 (Six) Hours As Needed. 9/15/20   Justine Whitaker MD   Rimegepant Sulfate (Nurtec) 75 MG tablet dispersible tablet Take 1 tablet by mouth 1 (One) Time As Needed. May repeat once in 2 hours if needed.    Justine Whitaker MD   SUMAtriptan (IMITREX) 100 MG tablet Take 1 tablet by mouth 1 (One) Time As Needed for Migraine. May repeat once in 2 hours if needed. 11/18/20   Justine Whitaker MD   topiramate (TOPAMAX) 200 MG tablet Take 1 tablet by mouth Daily. 10/9/20   Julianne  MD Justine   traZODone (DESYREL) 100 MG tablet Take 1 tablet by mouth Every Night.    Provider, MD Justine             Objective   Physical Exam  38-year-old female awake alert.  Generally overweight.  Pupils equal round react light.  Neck supple chest clear equal breath sounds.  Cardiovascular regular rate and rhythm without murmur gallop pressure.  Abdomen soft nontender.  Extremities reveal trace symmetric edema.  Procedures           ED Course                                           Medical Decision Making  Problems Addressed:  Chest pain, unspecified type: complicated acute illness or injury  Palpitations: complicated acute illness or injury    Amount and/or Complexity of Data Reviewed  Labs: ordered.  Radiology: ordered.    Risk  Prescription drug management.  Decision regarding hospitalization.    Chart review: Patient has had primary care evaluations for low back pain headaches anxiety seizure insomnia most recent visit April of this year for anxiety.  She had had cardiology appointment 2020 stress test and echocardiogram have been ordered but were never completed.  Comorbidity: As per past history   Differential: Reflux, angina, MI, atrial fibrillation,  My EKG interpretation: Sinus tachycardia rate of 122, nonspecific T wave flattening.  Compared to previous no significant change noted  Lab: Troponin less than 6 D-dimer normal 0.3 CBC normal comprehensive metabolic panel remarkable for glucose of 212 alkaline phosphatase of 153  My Radiology review and interpretation: Chest x-ray reveals clear lungs.  Pulmonary vasculature appears normal.  Heart size appears mildly enlarged  Discussion/treatment: Patient  also had evaluation end of June for chest pain palpitations.  Her repeat troponin did change from less than 6 to 12.  She was given additional Phenergan for nausea.  Findings were discussed with her.  She will be placed in observation with cardiology consult.   pagenighat  Patient was  evaluated using appropriate PPE      Final diagnoses:   Chest pain, unspecified type   Palpitations       ED Disposition  ED Disposition       ED Disposition   Decision to Admit    Condition   --    Comment   --               No follow-up provider specified.       Medication List      No changes were made to your prescriptions during this visit.            Judd Vernon MD  07/23/23 9580

## 2023-07-23 NOTE — CONSULTS
HP      Name: Cherie Louis ADMIT: 2023   : 1985  PCP: René Garcia MD    MRN: [id number] LOS: 0 days   AGE/SEX: 38 y.o. female  ROOM: 221/1     Chief Complaint   Patient presents with    Chest Pain       Subjective        History of present illness  Cherie Louis is a 38-year-old female patient who has Crohn's disease, complicated COVID infection in .  At the time reportedly she had CHF and A-fib.  Patient is admitted to the hospital due to sudden onset of palpitations.  She was at work.  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  Upon presentation she was in sinus rhythm in the 90s.  Patient is concerned because she had been also experiencing chest discomfort and in the past she was supposed to get a stress test and echocardiogram.    Past Medical History:   Diagnosis Date    Atrial fibrillation     CHF (congestive heart failure)     COVID-19     Crohn's disease     Epilepsy     Head injury     Hypotension     Migraines     Palpitations     PTSD (post-traumatic stress disorder)      Past Surgical History:   Procedure Laterality Date    ABDOMINAL SURGERY       SECTION      LAPAROTOMY OOPHERECTOMY Right      Family History   Problem Relation Age of Onset    Heart attack Mother     Lupus Mother     Heart attack Maternal Grandmother     Heart attack Maternal Grandfather      Social History     Tobacco Use    Smoking status: Never    Smokeless tobacco: Never   Vaping Use    Vaping Use: Never used   Substance Use Topics    Alcohol use: Not Currently     Alcohol/week: 1.0 standard drink     Types: 1 Glasses of wine per week     Comment: yearly    Drug use: No     Medications Prior to Admission   Medication Sig Dispense Refill Last Dose    cetirizine (zyrTEC) 10 MG tablet Take 1 tablet by mouth Daily.   2023    cyclobenzaprine (FLEXERIL) 10 MG tablet Take 1 tablet by mouth 2 (Two) Times a Day As Needed for Muscle Spasms.   2023    diltiaZEM (CARDIZEM)  60 MG tablet Take 2 tablets by mouth 2 (Two) Times a Day.   7/22/2023    DULoxetine (CYMBALTA) 60 MG capsule Take 1 capsule by mouth 2 (Two) Times a Day.   7/22/2023    gabapentin (NEURONTIN) 300 MG capsule Take 1 capsule by mouth 2 (two) times a day.   7/22/2023    levETIRAcetam (KEPPRA) 500 MG tablet Take 1 tablet by mouth 3 (Three) Times a Day.   7/22/2023    metoprolol tartrate (LOPRESSOR) 100 MG tablet Take 1 tablet by mouth 3 (Three) Times a Day.   7/22/2023    pantoprazole (PROTONIX) 40 MG EC tablet Take 1 tablet by mouth Daily.   7/22/2023    topiramate (TOPAMAX) 200 MG tablet Take 1 tablet by mouth Every Night.   7/22/2023    traZODone (DESYREL) 100 MG tablet Take 1 tablet by mouth Every Night.   7/22/2023    ALPRAZolam (XANAX) 0.5 MG tablet Take 1 tablet by mouth 4 (Four) Times a Day As Needed for Anxiety.       ondansetron ODT (ZOFRAN-ODT) 8 MG disintegrating tablet Place 1 tablet on the tongue Every 4 (Four) Hours As Needed for Nausea or Vomiting.       promethazine (PHENERGAN) 25 MG tablet Take 1 tablet by mouth 2 (Two) Times a Day As Needed for Nausea or Vomiting.       Rimegepant Sulfate (Nurtec) 75 MG tablet dispersible tablet Take 1 tablet by mouth 1 (One) Time As Needed. May repeat once in 2 hours if needed.       SUMAtriptan (IMITREX) 100 MG tablet Take 1 tablet by mouth 1 (One) Time As Needed for Migraine. May repeat once in 2 hours if needed.        Allergies:  Ativan [lorazepam], Compazine [prochlorperazine edisylate], and Haldol [haloperidol]    Review of systems    Constitutional: Negative.    Respiratory and cardiovascular: As detailed in HPI section.  Gastrointestinal: Negative for constipation, nausea and vomiting negative for abdominal distention, abdominal pain and diarrhea.   Genitourinary: Negative for difficulty urinating and flank pain.   Musculoskeletal: Negative for arthralgias, joint swelling and myalgias.   Skin: Negative for color change, rash and wound.   Neurological: Negative  for dizziness, syncope, weakness and headaches.   Hematological: Negative for adenopathy.   Psychiatric/Behavioral: Negative for confusion.   All other systems reviewed and are negative.       Physical Exam  VITALS REVIEWED    General:      well developed, in no acute distress.    Head:      normocephalic and atraumatic.    Eyes:      PERRL/EOM intact, conjunctiva and sclera clear with out nystagmus.    Neck:      no masses, thyromegaly,  trachea central with normal respiratory effort and PMI displaced laterally  Lungs:      Clear to auscultation bilaterally  Heart:       Regular rate and rhythm  Msk:      no deformity or scoliosis noted of thoracic or lumbar spine.    Pulses:      pulses normal in all 4 extremities.    Extremities:       No lower extremity edema  Neurologic:      no focal deficits.   alert oriented x3  Skin:      intact without lesions or rashes.    Psych:      alert and cooperative; normal mood and affect; normal attention span and concentration.      Result Review :               Pertinent cardiac workup    EKG 7/23/2023 sinus tachycardia        Assessment and Plan         Chest pain      Cherie Louis is a 38-year-old female patient was admitted to the hospital due to palpitations.  She has also been experiencing chest discomfort.  Patient has family history of premature coronary artery disease and is concerned about cardiac issues.  It was reported heart rate in the 200s, however no EKG documentation of that.  I would like to get an echocardiogram and a stress test, she was supposed to have this test previously anyway but never got to it.  As far as the palpitations, we can send her home with a 30-day event monitor and I will see her in follow-up afterwards.  If she does have SVT or other arrhythmias, then we can talk about therapy measures.  In the meantime I also advised her to look into smart watches with EKG capabilities return excellent resources to people with suspected SVT.        No  follow-ups on file.  Patient was given instructions and counseling regarding her condition or for health maintenance advice. Please see specific information pulled into the AVS if appropriate.

## 2023-07-23 NOTE — NURSING NOTE
Pt wanting dilaudid for crohn's disease abd pain, with phenergan, educated pt that NP ordered toradol and prednisone, pt refuses prednisone and would like some zofran with toradol. See MAR. Call light in reach, clean and dry.

## 2023-07-24 ENCOUNTER — APPOINTMENT (OUTPATIENT)
Dept: NUCLEAR MEDICINE | Facility: HOSPITAL | Age: 38
End: 2023-07-24
Payer: COMMERCIAL

## 2023-07-24 ENCOUNTER — APPOINTMENT (OUTPATIENT)
Dept: CT IMAGING | Facility: HOSPITAL | Age: 38
End: 2023-07-24
Payer: COMMERCIAL

## 2023-07-24 ENCOUNTER — APPOINTMENT (OUTPATIENT)
Dept: RESPIRATORY THERAPY | Facility: HOSPITAL | Age: 38
End: 2023-07-24
Payer: COMMERCIAL

## 2023-07-24 VITALS
TEMPERATURE: 97.6 F | WEIGHT: 243.17 LBS | BODY MASS INDEX: 36.02 KG/M2 | RESPIRATION RATE: 16 BRPM | SYSTOLIC BLOOD PRESSURE: 103 MMHG | HEART RATE: 75 BPM | OXYGEN SATURATION: 96 % | HEIGHT: 69 IN | DIASTOLIC BLOOD PRESSURE: 58 MMHG

## 2023-07-24 LAB
ANION GAP SERPL CALCULATED.3IONS-SCNC: 12 MMOL/L (ref 5–15)
BASOPHILS # BLD AUTO: 0.1 10*3/MM3 (ref 0–0.2)
BASOPHILS NFR BLD AUTO: 0.6 % (ref 0–1.5)
BH CV REST NUCLEAR ISOTOPE DOSE: 11 MCI
BH CV STRESS BP STAGE 1: NORMAL
BH CV STRESS BP STAGE 2: NORMAL
BH CV STRESS COMMENTS STAGE 1: NORMAL
BH CV STRESS COMMENTS STAGE 2: NORMAL
BH CV STRESS DOSE REGADENOSON STAGE 1: 0.4
BH CV STRESS DURATION MIN STAGE 1: 0
BH CV STRESS DURATION MIN STAGE 2: 4
BH CV STRESS DURATION SEC STAGE 1: 10
BH CV STRESS DURATION SEC STAGE 2: 0
BH CV STRESS HR STAGE 1: 79
BH CV STRESS HR STAGE 2: 77
BH CV STRESS NUCLEAR ISOTOPE DOSE: 30 MCI
BH CV STRESS PROTOCOL 1: NORMAL
BH CV STRESS RECOVERY BP: NORMAL MMHG
BH CV STRESS RECOVERY HR: 77 BPM
BH CV STRESS STAGE 1: 1
BH CV STRESS STAGE 2: 2
BUN SERPL-MCNC: 11 MG/DL (ref 6–20)
BUN/CREAT SERPL: 13.6 (ref 7–25)
CALCIUM SPEC-SCNC: 8.7 MG/DL (ref 8.6–10.5)
CHLORIDE SERPL-SCNC: 106 MMOL/L (ref 98–107)
CO2 SERPL-SCNC: 21 MMOL/L (ref 22–29)
CREAT SERPL-MCNC: 0.81 MG/DL (ref 0.57–1)
DEPRECATED RDW RBC AUTO: 45.1 FL (ref 37–54)
EGFRCR SERPLBLD CKD-EPI 2021: 96.6 ML/MIN/1.73
EOSINOPHIL # BLD AUTO: 0.5 10*3/MM3 (ref 0–0.4)
EOSINOPHIL NFR BLD AUTO: 4.3 % (ref 0.3–6.2)
ERYTHROCYTE [DISTWIDTH] IN BLOOD BY AUTOMATED COUNT: 14.8 % (ref 12.3–15.4)
GLUCOSE SERPL-MCNC: 136 MG/DL (ref 65–99)
HCT VFR BLD AUTO: 38.8 % (ref 34–46.6)
HGB BLD-MCNC: 12.8 G/DL (ref 12–15.9)
LIPASE SERPL-CCNC: 50 U/L (ref 13–60)
LV EF NUC BP: 53 %
LYMPHOCYTES # BLD AUTO: 4.7 10*3/MM3 (ref 0.7–3.1)
LYMPHOCYTES NFR BLD AUTO: 42.3 % (ref 19.6–45.3)
MAXIMAL PREDICTED HEART RATE: 184 BPM
MCH RBC QN AUTO: 29 PG (ref 26.6–33)
MCHC RBC AUTO-ENTMCNC: 33 G/DL (ref 31.5–35.7)
MCV RBC AUTO: 88.1 FL (ref 79–97)
MONOCYTES # BLD AUTO: 0.6 10*3/MM3 (ref 0.1–0.9)
MONOCYTES NFR BLD AUTO: 5.8 % (ref 5–12)
NEUTROPHILS NFR BLD AUTO: 47 % (ref 42.7–76)
NEUTROPHILS NFR BLD AUTO: 5.2 10*3/MM3 (ref 1.7–7)
NRBC BLD AUTO-RTO: 0 /100 WBC (ref 0–0.2)
PERCENT MAX PREDICTED HR: 42.93 %
PLATELET # BLD AUTO: 391 10*3/MM3 (ref 140–450)
PMV BLD AUTO: 7.6 FL (ref 6–12)
POTASSIUM SERPL-SCNC: 3.5 MMOL/L (ref 3.5–5.2)
RBC # BLD AUTO: 4.4 10*6/MM3 (ref 3.77–5.28)
SODIUM SERPL-SCNC: 139 MMOL/L (ref 136–145)
STRESS BASELINE BP: NORMAL MMHG
STRESS BASELINE HR: 69 BPM
STRESS PERCENT HR: 51 %
STRESS POST PEAK BP: NORMAL MMHG
STRESS POST PEAK HR: 79 BPM
STRESS TARGET HR: 156 BPM
WBC NRBC COR # BLD: 11.1 10*3/MM3 (ref 3.4–10.8)

## 2023-07-24 PROCEDURE — 93018 CV STRESS TEST I&R ONLY: CPT | Performed by: INTERNAL MEDICINE

## 2023-07-24 PROCEDURE — 25010000002 METOCLOPRAMIDE PER 10 MG: Performed by: PHYSICIAN ASSISTANT

## 2023-07-24 PROCEDURE — 99232 SBSQ HOSP IP/OBS MODERATE 35: CPT | Performed by: NURSE PRACTITIONER

## 2023-07-24 PROCEDURE — 25010000002 ONDANSETRON PER 1 MG: Performed by: NURSE PRACTITIONER

## 2023-07-24 PROCEDURE — 96361 HYDRATE IV INFUSION ADD-ON: CPT

## 2023-07-24 PROCEDURE — 78452 HT MUSCLE IMAGE SPECT MULT: CPT | Performed by: INTERNAL MEDICINE

## 2023-07-24 PROCEDURE — 93017 CV STRESS TEST TRACING ONLY: CPT

## 2023-07-24 PROCEDURE — 0 TECHNETIUM TETROFOSMIN KIT: Performed by: EMERGENCY MEDICINE

## 2023-07-24 PROCEDURE — 74176 CT ABD & PELVIS W/O CONTRAST: CPT

## 2023-07-24 PROCEDURE — 25010000002 REGADENOSON 0.4 MG/5ML SOLUTION: Performed by: EMERGENCY MEDICINE

## 2023-07-24 PROCEDURE — G0378 HOSPITAL OBSERVATION PER HR: HCPCS

## 2023-07-24 PROCEDURE — 25010000002 KETOROLAC TROMETHAMINE PER 15 MG: Performed by: NURSE PRACTITIONER

## 2023-07-24 PROCEDURE — 96376 TX/PRO/DX INJ SAME DRUG ADON: CPT

## 2023-07-24 PROCEDURE — A9502 TC99M TETROFOSMIN: HCPCS | Performed by: EMERGENCY MEDICINE

## 2023-07-24 PROCEDURE — 25010000002 DIPHENHYDRAMINE PER 50 MG: Performed by: PHYSICIAN ASSISTANT

## 2023-07-24 PROCEDURE — 78452 HT MUSCLE IMAGE SPECT MULT: CPT

## 2023-07-24 PROCEDURE — 96375 TX/PRO/DX INJ NEW DRUG ADDON: CPT

## 2023-07-24 PROCEDURE — 83690 ASSAY OF LIPASE: CPT | Performed by: NURSE PRACTITIONER

## 2023-07-24 PROCEDURE — 85025 COMPLETE CBC W/AUTO DIFF WBC: CPT | Performed by: NURSE PRACTITIONER

## 2023-07-24 PROCEDURE — 93016 CV STRESS TEST SUPVJ ONLY: CPT | Performed by: NURSE PRACTITIONER

## 2023-07-24 PROCEDURE — 80048 BASIC METABOLIC PNL TOTAL CA: CPT | Performed by: NURSE PRACTITIONER

## 2023-07-24 RX ORDER — REGADENOSON 0.08 MG/ML
0.4 INJECTION, SOLUTION INTRAVENOUS
Status: COMPLETED | OUTPATIENT
Start: 2023-07-24 | End: 2023-07-24

## 2023-07-24 RX ORDER — BUDESONIDE 3 MG/1
9 CAPSULE, COATED PELLETS ORAL DAILY
Qty: 90 CAPSULE | Refills: 0 | Status: SHIPPED | OUTPATIENT
Start: 2023-07-25 | End: 2023-08-24

## 2023-07-24 RX ORDER — DIPHENHYDRAMINE HYDROCHLORIDE 50 MG/ML
25 INJECTION INTRAMUSCULAR; INTRAVENOUS ONCE
Status: COMPLETED | OUTPATIENT
Start: 2023-07-24 | End: 2023-07-24

## 2023-07-24 RX ORDER — SODIUM CHLORIDE 9 MG/ML
100 INJECTION, SOLUTION INTRAVENOUS CONTINUOUS
Status: DISCONTINUED | OUTPATIENT
Start: 2023-07-24 | End: 2023-07-24 | Stop reason: HOSPADM

## 2023-07-24 RX ORDER — METOCLOPRAMIDE HYDROCHLORIDE 5 MG/ML
10 INJECTION INTRAMUSCULAR; INTRAVENOUS EVERY 6 HOURS SCHEDULED
Status: DISCONTINUED | OUTPATIENT
Start: 2023-07-24 | End: 2023-07-24 | Stop reason: HOSPADM

## 2023-07-24 RX ADMIN — Medication 10 ML: at 09:40

## 2023-07-24 RX ADMIN — DIPHENHYDRAMINE HYDROCHLORIDE 25 MG: 50 INJECTION, SOLUTION INTRAMUSCULAR; INTRAVENOUS at 04:26

## 2023-07-24 RX ADMIN — LEVETIRACETAM 500 MG: 500 TABLET, FILM COATED ORAL at 09:39

## 2023-07-24 RX ADMIN — ONDANSETRON 4 MG: 2 INJECTION INTRAMUSCULAR; INTRAVENOUS at 09:35

## 2023-07-24 RX ADMIN — BUDESONIDE 9 MG: 3 CAPSULE ORAL at 09:38

## 2023-07-24 RX ADMIN — PANTOPRAZOLE SODIUM 40 MG: 40 TABLET, DELAYED RELEASE ORAL at 09:38

## 2023-07-24 RX ADMIN — GABAPENTIN 300 MG: 300 CAPSULE ORAL at 09:39

## 2023-07-24 RX ADMIN — SODIUM CHLORIDE 100 ML/HR: 9 INJECTION, SOLUTION INTRAVENOUS at 10:05

## 2023-07-24 RX ADMIN — CETIRIZINE HYDROCHLORIDE 10 MG: 10 TABLET, FILM COATED ORAL at 09:39

## 2023-07-24 RX ADMIN — REGADENOSON 0.4 MG: 0.08 INJECTION, SOLUTION INTRAVENOUS at 08:42

## 2023-07-24 RX ADMIN — KETOROLAC TROMETHAMINE 15 MG: 15 INJECTION, SOLUTION INTRAMUSCULAR; INTRAVENOUS at 10:53

## 2023-07-24 RX ADMIN — KETOROLAC TROMETHAMINE 15 MG: 15 INJECTION, SOLUTION INTRAMUSCULAR; INTRAVENOUS at 04:26

## 2023-07-24 RX ADMIN — DULOXETINE HYDROCHLORIDE 60 MG: 30 CAPSULE, DELAYED RELEASE ORAL at 09:39

## 2023-07-24 RX ADMIN — METOCLOPRAMIDE 10 MG: 5 INJECTION, SOLUTION INTRAMUSCULAR; INTRAVENOUS at 04:26

## 2023-07-24 RX ADMIN — TETROFOSMIN 1 DOSE: 1.38 INJECTION, POWDER, LYOPHILIZED, FOR SOLUTION INTRAVENOUS at 07:27

## 2023-07-24 RX ADMIN — TETROFOSMIN 1 DOSE: 1.38 INJECTION, POWDER, LYOPHILIZED, FOR SOLUTION INTRAVENOUS at 08:42

## 2023-07-24 NOTE — PROGRESS NOTES
[facility]   PROGRESS NOTE      Referring Provider: Judd Vernon MD    Reason for follow-up: Chest pain     Patient Care Team:  René Garcia MD as PCP - General  Ottoniel Jacobo MD as Consulting Physician (Cardiology)      SUBJECTIVE    Patient doing fair today.  Complains of abdominal pain she attributes to her Crohn's disease.     Review of Systems   Constitutional: Negative for fever and malaise/fatigue.   Cardiovascular:  Negative for chest pain, leg swelling and palpitations.   Respiratory:  Negative for cough and shortness of breath.    Gastrointestinal:  Positive for abdominal pain and nausea. Negative for vomiting.   Neurological:  Negative for dizziness and light-headedness.   All other systems reviewed and are negative.    Allergies:  Ativan [lorazepam], Compazine [prochlorperazine edisylate], and Haldol [haloperidol]    Personal History:    Past Medical History:   Diagnosis Date    Atrial fibrillation     CHF (congestive heart failure)     COVID-19     Crohn's disease 2013    Epilepsy     Head injury     Hypotension     Migraines     Palpitations     PTSD (post-traumatic stress disorder)        Past Surgical History:   Procedure Laterality Date    ABDOMINAL SURGERY       SECTION      LAPAROTOMY OOPHERECTOMY Right        Family History   Problem Relation Age of Onset    Heart attack Mother     Lupus Mother     Heart attack Maternal Grandmother     Heart attack Maternal Grandfather        Social History     Tobacco Use    Smoking status: Never    Smokeless tobacco: Never   Vaping Use    Vaping Use: Never used   Substance Use Topics    Alcohol use: Not Currently     Alcohol/week: 1.0 standard drink     Types: 1 Glasses of wine per week     Comment: yearly    Drug use: No        Medications Prior to Admission   Medication Sig Dispense Refill Last Dose    cetirizine (zyrTEC) 10 MG tablet Take 1 tablet by mouth Daily.   2023     cyclobenzaprine (FLEXERIL) 10 MG tablet Take 1 tablet by mouth 2 (Two) Times a Day As Needed for Muscle Spasms.   7/22/2023    diltiaZEM (CARDIZEM) 60 MG tablet Take 2 tablets by mouth 2 (Two) Times a Day.   7/22/2023    DULoxetine (CYMBALTA) 60 MG capsule Take 1 capsule by mouth 2 (Two) Times a Day.   7/22/2023    gabapentin (NEURONTIN) 300 MG capsule Take 1 capsule by mouth 2 (two) times a day.   7/22/2023    levETIRAcetam (KEPPRA) 500 MG tablet Take 1 tablet by mouth 3 (Three) Times a Day.   7/22/2023    metoprolol tartrate (LOPRESSOR) 100 MG tablet Take 1 tablet by mouth 3 (Three) Times a Day.   7/22/2023    pantoprazole (PROTONIX) 40 MG EC tablet Take 1 tablet by mouth Daily.   7/22/2023    topiramate (TOPAMAX) 200 MG tablet Take 1 tablet by mouth Every Night.   7/22/2023    traZODone (DESYREL) 100 MG tablet Take 1 tablet by mouth Every Night.   7/22/2023    ALPRAZolam (XANAX) 0.5 MG tablet Take 1 tablet by mouth 4 (Four) Times a Day As Needed for Anxiety.       ondansetron ODT (ZOFRAN-ODT) 8 MG disintegrating tablet Place 1 tablet on the tongue Every 4 (Four) Hours As Needed for Nausea or Vomiting.       promethazine (PHENERGAN) 25 MG tablet Take 1 tablet by mouth 2 (Two) Times a Day As Needed for Nausea or Vomiting.       Rimegepant Sulfate (Nurtec) 75 MG tablet dispersible tablet Take 1 tablet by mouth 1 (One) Time As Needed. May repeat once in 2 hours if needed.       SUMAtriptan (IMITREX) 100 MG tablet Take 1 tablet by mouth 1 (One) Time As Needed for Migraine. May repeat once in 2 hours if needed.            OBJECTIVE    VITAL SIGNS  Vitals:    07/24/23 0308 07/24/23 0637 07/24/23 0939 07/24/23 1107   BP: 103/59 95/61 (!) 89/59 (!) 88/55   BP Location: Left arm Left arm  Left arm   Patient Position: Lying Lying  Lying   Pulse: 74 64 70 75   Resp: 14 16  16   Temp:  97.3 °F (36.3 °C)  97.6 °F (36.4 °C)   TempSrc: Oral Oral  Oral   SpO2: 99% 97%  96%   Weight:       Height:         Flowsheet Rows   "    Flowsheet Row First Filed Value   Admission Height 175.3 cm (69\") Documented at 07/23/2023 0736   Admission Weight 112 kg (247 lb) Documented at 07/23/2023 0736             TELEMETRY: Sinus rhythm    Physical Exam:  Vitals reviewed.   Constitutional:       General: Awake.      Appearance: Not in distress. Obese.   Eyes:      Pupils: Pupils are equal, round, and reactive to light.   Pulmonary:      Effort: Pulmonary effort is normal.      Breath sounds: Normal breath sounds.   Cardiovascular:      Normal rate. Regular rhythm. Normal S1. Normal S2.    Pulses:     Intact distal pulses.   Edema:     Peripheral edema absent.   Abdominal:      General: Bowel sounds are normal.      Palpations: Abdomen is soft.   Musculoskeletal: Normal range of motion.      Cervical back: Normal range of motion. Skin:     General: Skin is warm and dry.   Neurological:      Mental Status: Alert and oriented to person, place and time.   Psychiatric:         Behavior: Behavior is cooperative.        LAB RESULTS (LAST 7 DAYS)  I have reviewed new clinical results.    CMP   Results from last 7 days   Lab Units 07/24/23  0520 07/23/23  0812   SODIUM mmol/L 139 142   POTASSIUM mmol/L 3.5 3.6   CHLORIDE mmol/L 106 107   CO2 mmol/L 21.0* 23.0   BUN mg/dL 11 11   CREATININE mg/dL 0.81 0.74   GLUCOSE mg/dL 136* 212*   ALBUMIN g/dL  --  4.3   BILIRUBIN mg/dL  --  <0.2   ALK PHOS U/L  --  143*   AST (SGOT) U/L  --  18   ALT (SGPT) U/L  --  24   LIPASE U/L 50  --      CBC  Results from last 7 days   Lab Units 07/24/23  0520 07/23/23  0812   WBC 10*3/mm3 11.10* 9.70   RBC 10*6/mm3 4.40 4.55   HEMOGLOBIN g/dL 12.8 13.1   HEMATOCRIT % 38.8 39.7   MCV fL 88.1 87.4   PLATELETS 10*3/mm3 391 399     ProBNP      TROPONIN  Results from last 7 days   Lab Units 07/23/23  1038   HSTROP T ng/L 12*     CoAg      Creatinine Clearance  Estimated Creatinine Clearance: 126.9 mL/min (by C-G formula based on SCr of 0.81 mg/dL).    Radiology  CT Abdomen Pelvis Without " Contrast    Result Date: 7/24/2023  1. No acute abdominopelvic process demonstrated 2. Colonic diverticulosis 3. Status post cholecystectomy, appendectomy, right oophorectomy 4. Trace pleural effusions Electronically Signed: Bernard Emerson  7/24/2023 8:26 AM EDT  Workstation ID: [id number]    XR Chest 1 View    Result Date: 7/23/2023  Impression: 1.No acute radiographic abnormality is identified. Electronically Signed: Darrian Arambula  7/23/2023 8:48 AM EDT  Workstation ID: [id number]     EKG    I personally viewed and interpreted the patient's EKG/Telemetry data:  ECG 12 Lead Chest Pain   Final Result   HEART RATE= 122  bpm   RR Interval= 492  ms   WA Interval= 147  ms   P Horizontal Axis= 17  deg   P Front Axis= 61  deg   QRSD Interval= 80  ms   QT Interval=   ms   QRS Axis= 35  deg   T Wave Axis=   deg   - ABNORMAL ECG -   Sinus tachycardia   Nonspecific T abnrm, anterolateral leads   When compared with ECG of 27-Jun-2023 9:04:23,   No significant change   Electronically Signed By: Judd Vernon ([facility]) 24-Jul-2023 08:04:25   Date and Time of Study: 2023-07-23 07:45:20      SCANNED - TELEMETRY     Final Result      SCANNED - TELEMETRY     Final Result      SCANNED - TELEMETRY     Final Result      SCANNED - TELEMETRY     Final Result      SCANNED - TELEMETRY     Final Result      SCANNED - TELEMETRY     Final Result      SCANNED - TELEMETRY     Final Result          ECHOCARDIOGRAM:      STRESS MYOVIEW:  Results for orders placed during the hospital encounter of 07/23/23    Stress Test With Myocardial Perfusion One Day    Interpretation Summary    Left ventricular ejection fraction is normal (Calculated EF = 53%).    Myocardial perfusion imaging indicates a normal myocardial perfusion study with no evidence of ischemia.    Impressions are consistent with a low risk study.      CARDIAC CATHETERIZATION:  No results found for this or any previous visit.      OTHER:       ASSESSMENT/PLAN      Chest pain      PLAN  Nuclear  stress test was negative for ischemia  MCOT at discharge  Follow-up with Dr. Mayberry in 6 weeks  Continue current home medications    I discussed the patients findings and my recommendations with patient and nurse.      JOHANNE Rodriguez  07/24/23  13:54 EDT  Electronically signed by JOHANNE Rodriguez, 07/24/23, 1:58 PM EDT.

## 2023-07-24 NOTE — PLAN OF CARE
Goal Outcome Evaluation:  Plan of Care Reviewed With: patient        Progress: improving  Outcome Evaluation: Pt should be discharged today with a MCOT, should have echo and stress test today. Care continues.

## 2023-07-24 NOTE — CASE MANAGEMENT/SOCIAL WORK
Case Management Discharge Note      Final Note: Routine home               Transportation Services  Private: Car    Final Discharge Disposition Code: 01 - home or self-care

## 2023-07-24 NOTE — DISCHARGE SUMMARY
[facility]    René Garcia MD    CHIEF COMPLAINT:     Palpitations    HISTORY OF PRESENT ILLNESS:    HPI    38-year-old female presents with complaints of chest pain. She states that started around 645 this morning. She states it was very severe. She states put a pulse oximeter on her and her heart rate went up to 212. She has some radiation of pain to her shoulder. She states pain is better now than it was but is not resolved she has had some sweats. She states she had some nausea when she woke up around 445 but this is not unusual as she has Crohn's disease. She had taken Zofran for this. She reports no fever or cough. She does not complain of thoracic back pain. She does complain of some lower back pain.     Past Medical History:   Diagnosis Date    Atrial fibrillation     CHF (congestive heart failure)     COVID-19     Crohn's disease 2013    Epilepsy     Head injury     Hypotension     Migraines     Palpitations     PTSD (post-traumatic stress disorder)      Past Surgical History:   Procedure Laterality Date    ABDOMINAL SURGERY       SECTION      LAPAROTOMY OOPHERECTOMY Right      Family History   Problem Relation Age of Onset    Heart attack Mother     Lupus Mother     Heart attack Maternal Grandmother     Heart attack Maternal Grandfather      Social History     Tobacco Use    Smoking status: Never    Smokeless tobacco: Never   Vaping Use    Vaping Use: Never used   Substance Use Topics    Alcohol use: Not Currently     Alcohol/week: 1.0 standard drink     Types: 1 Glasses of wine per week     Comment: yearly    Drug use: No     Medications Prior to Admission   Medication Sig Dispense Refill Last Dose    cetirizine (zyrTEC) 10 MG tablet Take 1 tablet by mouth Daily.   2023    cyclobenzaprine (FLEXERIL) 10 MG tablet Take 1 tablet by mouth 2 (Two) Times a Day As Needed for Muscle Spasms.   2023    diltiaZEM (CARDIZEM) 60 MG tablet Take 2 tablets by mouth 2  (Two) Times a Day.   7/22/2023    DULoxetine (CYMBALTA) 60 MG capsule Take 1 capsule by mouth 2 (Two) Times a Day.   7/22/2023    gabapentin (NEURONTIN) 300 MG capsule Take 1 capsule by mouth 2 (two) times a day.   7/22/2023    levETIRAcetam (KEPPRA) 500 MG tablet Take 1 tablet by mouth 3 (Three) Times a Day.   7/22/2023    metoprolol tartrate (LOPRESSOR) 100 MG tablet Take 1 tablet by mouth 3 (Three) Times a Day.   7/22/2023    pantoprazole (PROTONIX) 40 MG EC tablet Take 1 tablet by mouth Daily.   7/22/2023    topiramate (TOPAMAX) 200 MG tablet Take 1 tablet by mouth Every Night.   7/22/2023    traZODone (DESYREL) 100 MG tablet Take 1 tablet by mouth Every Night.   7/22/2023    ALPRAZolam (XANAX) 0.5 MG tablet Take 1 tablet by mouth 4 (Four) Times a Day As Needed for Anxiety.       ondansetron ODT (ZOFRAN-ODT) 8 MG disintegrating tablet Place 1 tablet on the tongue Every 4 (Four) Hours As Needed for Nausea or Vomiting.       promethazine (PHENERGAN) 25 MG tablet Take 1 tablet by mouth 2 (Two) Times a Day As Needed for Nausea or Vomiting.       Rimegepant Sulfate (Nurtec) 75 MG tablet dispersible tablet Take 1 tablet by mouth 1 (One) Time As Needed. May repeat once in 2 hours if needed.       SUMAtriptan (IMITREX) 100 MG tablet Take 1 tablet by mouth 1 (One) Time As Needed for Migraine. May repeat once in 2 hours if needed.        Allergies:  Ativan [lorazepam], Compazine [prochlorperazine edisylate], and Haldol [haloperidol]      There is no immunization history on file for this patient.        REVIEW OF SYSTEMS:    Review of Systems   Constitutional: Positive for malaise/fatigue.   HENT: Negative.     Eyes: Negative.    Cardiovascular:  Positive for palpitations.   Respiratory: Negative.     Endocrine: Negative.    Hematologic/Lymphatic: Negative.    Skin: Negative.    Musculoskeletal: Negative.    Gastrointestinal:  Positive for abdominal pain and nausea.   Genitourinary: Negative.    Neurological: Negative.     Psychiatric/Behavioral:  The patient is nervous/anxious.    Allergic/Immunologic: Negative.      Vital Signs  Temp:  [97.3 °F (36.3 °C)-98.2 °F (36.8 °C)] 97.6 °F (36.4 °C)  Heart Rate:  [64-90] 75  Resp:  [14-22] 16  BP: ()/(55-71) 88/55          Physical Exam:  Physical Exam  Vitals and nursing note reviewed.   Constitutional:       Appearance: Normal appearance.   HENT:      Head: Normocephalic and atraumatic.      Right Ear: External ear normal.      Left Ear: External ear normal.      Nose: Nose normal.      Mouth/Throat:      Pharynx: Oropharynx is clear.   Eyes:      Extraocular Movements: Extraocular movements intact.      Conjunctiva/sclera: Conjunctivae normal.      Pupils: Pupils are equal, round, and reactive to light.   Cardiovascular:      Rate and Rhythm: Normal rate and regular rhythm.      Pulses: Normal pulses.   Pulmonary:      Effort: Pulmonary effort is normal.   Abdominal:      General: Bowel sounds are normal.      Palpations: Abdomen is soft.   Musculoskeletal:         General: Normal range of motion.      Cervical back: Normal range of motion.   Skin:     General: Skin is warm.      Capillary Refill: Capillary refill takes less than 2 seconds.   Neurological:      Mental Status: She is alert and oriented to person, place, and time.   Psychiatric:         Mood and Affect: Mood normal.         Behavior: Behavior normal.         Thought Content: Thought content normal.         Judgment: Judgment normal.       Emotional Behavior:    WNL   Debilities:   none  Results Review:    I reviewed the patient's new clinical results.  Lab Results (most recent)       Procedure Component Value Units Date/Time    Basic Metabolic Panel [[id number]]  (Abnormal) Collected: 07/24/23 0520    Specimen: Blood from Arm, Left Updated: 07/24/23 0650     Glucose 136 mg/dL      BUN 11 mg/dL      Creatinine 0.81 mg/dL      Sodium 139 mmol/L      Potassium 3.5 mmol/L      Chloride 106 mmol/L      CO2 21.0 mmol/L       Calcium 8.7 mg/dL      BUN/Creatinine Ratio 13.6     Anion Gap 12.0 mmol/L      eGFR 96.6 mL/min/1.73     Narrative:      GFR Normal >60  Chronic Kidney Disease <60  Kidney Failure <15      Lipase [[id number]]  (Normal) Collected: 07/24/23 0520    Specimen: Blood from Arm, Left Updated: 07/24/23 0650     Lipase 50 U/L     CBC & Differential [[id number]]  (Abnormal) Collected: 07/24/23 0520    Specimen: Blood from Arm, Left Updated: 07/24/23 0631    Narrative:      The following orders were created for panel order CBC & Differential.  Procedure                               Abnormality         Status                     ---------                               -----------         ------                     CBC Auto Differential[[id number]]        Abnormal            Final result                 Please view results for these tests on the individual orders.    CBC Auto Differential [[id number]]  (Abnormal) Collected: 07/24/23 0520    Specimen: Blood from Arm, Left Updated: 07/24/23 0631     WBC 11.10 10*3/mm3      RBC 4.40 10*6/mm3      Hemoglobin 12.8 g/dL      Hematocrit 38.8 %      MCV 88.1 fL      MCH 29.0 pg      MCHC 33.0 g/dL      RDW 14.8 %      RDW-SD 45.1 fl      MPV 7.6 fL      Platelets 391 10*3/mm3      Neutrophil % 47.0 %      Lymphocyte % 42.3 %      Monocyte % 5.8 %      Eosinophil % 4.3 %      Basophil % 0.6 %      Neutrophils, Absolute 5.20 10*3/mm3      Lymphocytes, Absolute 4.70 10*3/mm3      Monocytes, Absolute 0.60 10*3/mm3      Eosinophils, Absolute 0.50 10*3/mm3      Basophils, Absolute 0.10 10*3/mm3      nRBC 0.0 /100 WBC     C-reactive Protein [[id number]]  (Abnormal) Collected: 07/23/23 1038    Specimen: Blood from Arm, Right Updated: 07/23/23 2307     C-Reactive Protein 1.38 mg/dL     High Sensitivity Troponin T 2Hr [[id number]]  (Abnormal) Collected: 07/23/23 1038    Specimen: Blood from Arm, Right Updated: 07/23/23 1102     HS Troponin T 12 ng/L      Troponin T Delta --     Comment: Unable to  calculate.       Narrative:      High Sensitive Troponin T Reference Range:  <10.0 ng/L- Negative Female for AMI  <15.0 ng/L- Negative Male for AMI  >=10 - Abnormal Female indicating possible myocardial injury.  >=15 - Abnormal Male indicating possible myocardial injury.   Clinicians would have to utilize clinical acumen, EKG, Troponin, and serial changes to determine if it is an Acute Myocardial Infarction or myocardial injury due to an underlying chronic condition.         Comprehensive Metabolic Panel [[id number]]  (Abnormal) Collected: 07/23/23 0812    Specimen: Blood Updated: 07/23/23 0842     Glucose 212 mg/dL      BUN 11 mg/dL      Creatinine 0.74 mg/dL      Sodium 142 mmol/L      Potassium 3.6 mmol/L      Chloride 107 mmol/L      CO2 23.0 mmol/L      Calcium 9.6 mg/dL      Total Protein 7.0 g/dL      Albumin 4.3 g/dL      ALT (SGPT) 24 U/L      AST (SGOT) 18 U/L      Alkaline Phosphatase 143 U/L      Total Bilirubin <0.2 mg/dL      Globulin 2.7 gm/dL      A/G Ratio 1.6 g/dL      BUN/Creatinine Ratio 14.9     Anion Gap 12.0 mmol/L      eGFR 107.7 mL/min/1.73     Narrative:      GFR Normal >60  Chronic Kidney Disease <60  Kidney Failure <15      High Sensitivity Troponin T [[id number]]  (Normal) Collected: 07/23/23 0812    Specimen: Blood Updated: 07/23/23 0842     HS Troponin T <6 ng/L     Narrative:      High Sensitive Troponin T Reference Range:  <10.0 ng/L- Negative Female for AMI  <15.0 ng/L- Negative Male for AMI  >=10 - Abnormal Female indicating possible myocardial injury.  >=15 - Abnormal Male indicating possible myocardial injury.   Clinicians would have to utilize clinical acumen, EKG, Troponin, and serial changes to determine if it is an Acute Myocardial Infarction or myocardial injury due to an underlying chronic condition.         D-dimer, Quantitative [[id number]]  (Normal) Collected: 07/23/23 0812    Specimen: Blood Updated: 07/23/23 0829     D-Dimer, Quantitative 0.30 mg/L (FEU)     Narrative:    "   According to the assay 's published package insert, a normal (<0.50 mg/L (FEU)) D-dimer result in conjunction with a non-high clinical probability assessment, excludes deep vein thrombosis (DVT) and pulmonary embolism (PE) with high sensitivity.    D-dimer values increase with age and this can make VTE exclusion of an older population difficult. To address this, the [employer], based on best available evidence and recent guidelines, recommends that clinicians use age-adjusted D-dimer thresholds in patients greater than 50 years of age with: a) a low probability of PE who do not meet all Pulmonary Embolism Rule Out Criteria, or b) in those with intermediate probability of PE.   The formula for an age-adjusted D-dimer cut-off is \"age/100\".  For example, a 60 year old patient would have an age-adjusted cut-off of 0.60 mg/L (FEU) and an 80 year old 0.80 mg/L (FEU).    CBC & Differential [[id number]]  (Abnormal) Collected: 07/23/23 0812    Specimen: Blood Updated: 07/23/23 0819    Narrative:      The following orders were created for panel order CBC & Differential.  Procedure                               Abnormality         Status                     ---------                               -----------         ------                     CBC Auto Differential[[id number]]        Abnormal            Final result                 Please view results for these tests on the individual orders.    CBC Auto Differential [[id number]]  (Abnormal) Collected: 07/23/23 0812    Specimen: Blood Updated: 07/23/23 0819     WBC 9.70 10*3/mm3      RBC 4.55 10*6/mm3      Hemoglobin 13.1 g/dL      Hematocrit 39.7 %      MCV 87.4 fL      MCH 28.8 pg      MCHC 33.0 g/dL      RDW 14.8 %      RDW-SD 45.1 fl      MPV 7.5 fL      Platelets 399 10*3/mm3      Neutrophil % 67.2 %      Lymphocyte % 25.1 %      Monocyte % 4.2 %      Eosinophil % 2.8 %      Basophil % 0.7 %      Neutrophils, Absolute 6.50 10*3/mm3      " Lymphocytes, Absolute 2.40 10*3/mm3      Monocytes, Absolute 0.40 10*3/mm3      Eosinophils, Absolute 0.30 10*3/mm3      Basophils, Absolute 0.10 10*3/mm3      nRBC 0.0 /100 WBC             Imaging Results (Most Recent)       Procedure Component Value Units Date/Time    CT Abdomen Pelvis Without Contrast [[id number]] Collected: 07/24/23 0819     Updated: 07/24/23 0828    Narrative:      CT ABDOMEN PELVIS WO CONTRAST    Date of Exam: 7/24/2023 7:52 AM EDT    Indication: Nausea/vomiting  Abdominal pain, acute, nonlocalized.    Comparison: 9/25/2022    Technique: Axial CT images were obtained of the abdomen and pelvis without the administration of contrast. Sagittal and coronal reconstructions were performed.  Automated exposure control and iterative reconstruction methods were used.      Findings:    Lower Chest: Trace bilateral pleural effusions. Dependent atelectasis in the lower lobes    Organs: No urolithiasis or hydronephrosis. Kidneys, adrenal glands, liver, spleen, pancreas have an unremarkable noncontrast appearance. Gallbladder surgically absent    Gastrointestinal: Appendix surgically absent. Scattered colonic diverticula with no associated inflammation. No intestinal distention or evident wall thickening. No acute process demonstrated    Pelvis: No abnormal fluid collection. Uterus and left ovary within normal limits. Right ovary surgically absent. Urinary bladder unremarkable    Peritoneum/Retroperitoneum: No ascites or pneumoperitoneum. Normal caliber aorta    Bones/Soft Tissues: No acute bony abnormality      Impression:        1. No acute abdominopelvic process demonstrated  2. Colonic diverticulosis  3. Status post cholecystectomy, appendectomy, right oophorectomy  4. Trace pleural effusions              Electronically Signed: Bernard Emerson    7/24/2023 8:26 AM EDT    Workstation ID: [id number]    XR Chest 1 View [[id number]] Collected: 07/23/23 0848     Updated: 07/23/23 0850    Narrative:      XR CHEST 1  VW    Date of Exam: 7/23/2023 8:24 AM EDT    Indication: pain    Comparison: 6/27/2023    Findings:  The lungs appear adequately aerated without consolidation or mass. No pleural effusion or pneumothorax is identified. Cardiac silhouette is magnified. Pulmonary vasculature is unremarkable. No acute or suspicious osseous lesion is identified.       Impression:      Impression:  1.No acute radiographic abnormality is identified.    Electronically Signed: Darrian Arambula    7/23/2023 8:48 AM EDT    Workstation ID: [id number]          reviewed    ECG/EMG Results (most recent)       Procedure Component Value Units Date/Time    ECG 12 Lead Chest Pain [[id number]] Collected: 07/23/23 0745     Updated: 07/24/23 0804    Narrative:      HEART RATE= 122  bpm  RR Interval= 492  ms  PA Interval= 147  ms  P Horizontal Axis= 17  deg  P Front Axis= 61  deg  QRSD Interval= 80  ms  QT Interval=   ms  QRS Axis= 35  deg  T Wave Axis=   deg  - ABNORMAL ECG -  Sinus tachycardia  Nonspecific T abnrm, anterolateral leads  When compared with ECG of 27-Jun-2023 9:04:23,  No significant change  Electronically Signed By: Judd Vernon ([facility]) 24-Jul-2023 08:04:25  Date and Time of Study: 2023-07-23 07:45:20    SCANNED - TELEMETRY   [[id number]] Resulted: 07/23/23     Updated: 07/24/23 0839    SCANNED - TELEMETRY   [[id number]] Resulted: 07/23/23     Updated: 07/24/23 0839    SCANNED - TELEMETRY   [[id number]] Resulted: 07/23/23     Updated: 07/24/23 0913    SCANNED - TELEMETRY   [[id number]] Resulted: 07/23/23     Updated: 07/24/23 0919    SCANNED - TELEMETRY   [[id number]] Resulted: 07/23/23     Updated: 07/24/23 0934    SCANNED - TELEMETRY   [[id number]] Resulted: 07/23/23     Updated: 07/24/23 0945    SCANNED - TELEMETRY   [[id number]] Resulted: 07/23/23     Updated: 07/24/23 1139          reviewed            Microbiology Results (last 10 days)       ** No results found for the last 240 hours. **            Assessment & Plan     Chest pain     Chest  pain  Lab Results   Component Value Date    TROPONINT 12 (H) 07/23/2023    TROPONINT <6 07/23/2023    TROPONINT <6 06/27/2023     -D-dimer negative  -Chest X-ray: No acute cardiopulmonary process seen  -EKG shows sinus tachycardia heart rate 122 with normal sinus rhythm on unit  -In the ED pt given Phenergan and Dilaudid  -Stress Test ordered showed EF 53% with no ischemia  -Telemetry  -Continue Cardizem and metoprolol  -NPO  -Cardiology consulted    Crohn's disease    Lab Results   Component Value Date    WBC 11.10 (H) 07/24/2023    AST 18 07/23/2023    ALT 24 07/23/2023    ALKPHOS 143 (H) 07/23/2023    BILITOT <0.2 07/23/2023    LIPASE 50 07/24/2023     -CT of abdomen and pelvis: No acute abdominal pelvic process seen with colonic diverticulosis  -CRP 1.38  -IV fluids  -IV/oral antiemetics as needed  -Patient declined prednisone    Generalized anxiety disorder  -Continue home medication      I discussed the patients findings and my recommendations with patient and family.     Discharge Diagnosis:      Chest pain      Hospital Course  Patient is a 38 y.o. female presented with palpitations.  Patient was at work when she started to have palpitations and chest pain.  Patient has pulse oximeter and heart rate machine showed heart rate in 200s.  Patient states she had some nausea but thought that was related to Crohn's disease.  Patient EKG which showed heart rate of 122 but sinus rhythm with heart rate between 60s and 90s on unit.  Troponins unremarkable.  Chest x-ray showed no acute cardiopulmonary process.  D-dimer negative.  Cardiology consulted and requested echocardiogram and stress test.  Stress test revealed no ischemia with EF 53%.  Patient cleared by cardiology to go home with 30-day event monitor and monitor blood pressures closely at home.  Patient also reported abdominal pain with CT and pelvis showing no acute abdominal process colonic diverticulosis.  CRP was slightly up at 1.3 and WBC 11.1.  All other  labs unremarkable.  Patient declined prednisone and states she does not take any medication for Crohn's disease currently.  Patient advised to follow-up with gastroenterology in acceptable appointment.  Patient is advised to follow-up with PCP in 1 week for continued care management.  Tests and recommendations reviewed patient and she agreed to treatment plan.  If symptoms worsen patient to call 911 or go to nearest ED.    Past Medical History:     Past Medical History:   Diagnosis Date    Atrial fibrillation     CHF (congestive heart failure)     COVID-19     Crohn's disease 2013    Epilepsy     Head injury     Hypotension     Migraines     Palpitations     PTSD (post-traumatic stress disorder)        Past Surgical History:     Past Surgical History:   Procedure Laterality Date    ABDOMINAL SURGERY       SECTION      LAPAROTOMY OOPHERECTOMY Right        Social History:   Social History     Socioeconomic History    Marital status:    Tobacco Use    Smoking status: Never    Smokeless tobacco: Never   Vaping Use    Vaping Use: Never used   Substance and Sexual Activity    Alcohol use: Not Currently     Alcohol/week: 1.0 standard drink     Types: 1 Glasses of wine per week     Comment: yearly    Drug use: No    Sexual activity: Defer       Procedures Performed         Consults:   Consults       Date and Time Order Name Status Description    2023 11:41 AM Cardiology (on-call MD unless specified)              Condition on Discharge:     Stable    Discharge Disposition  Home or Self Care    Discharge Medications     Discharge Medications        New Medications        Instructions Start Date   Budesonide 3 MG 24 hr capsule  Commonly known as: ENTOCORT EC   9 mg, Oral, Daily   Start Date: 2023            Continue These Medications        Instructions Start Date   ALPRAZolam 0.5 MG tablet  Commonly known as: XANAX   0.5 mg, Oral, 4 Times Daily PRN      cetirizine 10 MG tablet  Commonly known as:  zyrTEC   10 mg, Oral, Daily      cyclobenzaprine 10 MG tablet  Commonly known as: FLEXERIL   10 mg, Oral, 2 Times Daily PRN      dilTIAZem 60 MG tablet  Commonly known as: CARDIZEM   120 mg, Oral, 2 Times Daily      DULoxetine 60 MG capsule  Commonly known as: CYMBALTA   60 mg, Oral, 2 Times Daily      gabapentin 300 MG capsule  Commonly known as: NEURONTIN   300 mg, Oral, 2 times daily      levETIRAcetam 500 MG tablet  Commonly known as: KEPPRA   500 mg, Oral, 3 Times Daily      metoprolol tartrate 100 MG tablet  Commonly known as: LOPRESSOR   100 mg, Oral, 3 Times Daily      Nurtec 75 MG tablet dispersible tablet  Generic drug: Rimegepant Sulfate   1 tablet, Oral, Once As Needed, May repeat once in 2 hours if needed.       ondansetron ODT 8 MG disintegrating tablet  Commonly known as: ZOFRAN-ODT   8 mg, Translingual, Every 4 Hours PRN      pantoprazole 40 MG EC tablet  Commonly known as: PROTONIX   40 mg, Oral, Daily      promethazine 25 MG tablet  Commonly known as: PHENERGAN   25 mg, Oral, 2 Times Daily PRN      SUMAtriptan 100 MG tablet  Commonly known as: IMITREX   100 mg, Oral, Once As Needed, May repeat once in 2 hours if needed.       topiramate 200 MG tablet  Commonly known as: TOPAMAX   200 mg, Oral, Nightly      traZODone 100 MG tablet  Commonly known as: DESYREL   100 mg, Oral, Nightly               Discharge Diet:   Diet Instructions       Diet: Cardiac Diets; Healthy Heart (2-3 Na+); Regular Texture (IDDSI 7); Thin (IDDSI 0)      Discharge Diet: Cardiac Diets    Cardiac Diet: Healthy Heart (2-3 Na+)    Texture: Regular Texture (IDDSI 7)    Fluid Consistency: Thin (IDDSI 0)            Activity at Discharge:   Activity Instructions       Activity as Tolerated      Measure Blood Pressure              Follow-up Appointments  Future Appointments   Date Time Provider Department Center   7/24/2023  1:15 PM [facility]     Additional Instructions for the Follow-ups that You Need to Schedule        Discharge Follow-up with PCP   As directed       Currently Documented PCP:    René Garcia MD    PCP Phone Number:    [phone]     Follow Up Details: 7-10 days                 Test Results Pending at Discharge       Risk for Readmission (LACE) Score: 2 (7/24/2023  6:00 AM)          JOHANNE Owen  07/24/23  13:03 EDT        I spent 35 minutes caring for Cherie on this date of service. This time includes time spent by me in the following activities: reviewing tests, obtaining and/or reviewing a separately obtained history, performing a medically appropriate examination and/or evaluation, counseling and educating the patient/family/caregiver, ordering medications, tests, or procedures, referring and communicating with other health care professionals, documenting information in the medical record, independently interpreting results and communicating that information with the patient/family/caregiver, and care coordination.

## 2023-07-24 NOTE — PLAN OF CARE
Problem: Adult Inpatient Plan of Care  Goal: Plan of Care Review  Outcome: Met  Flowsheets (Taken 7/24/2023 1400)  Plan of Care Reviewed With: patient  Outcome Evaluation: Patient had Myoview today, patient agreeable to wear MCOT, patient ready to discharge  Goal: Patient-Specific Goal (Individualized)  Outcome: Met  Goal: Absence of Hospital-Acquired Illness or Injury  Outcome: Met  Intervention: Identify and Manage Fall Risk  Recent Flowsheet Documentation  Taken 7/24/2023 1200 by Sherrill Sifuentes RN  Safety Promotion/Fall Prevention: safety round/check completed  Taken 7/24/2023 1000 by Sherrill Sifuentes RN  Safety Promotion/Fall Prevention: safety round/check completed  Taken 7/24/2023 0925 by Sherrill Sifuentes RN  Safety Promotion/Fall Prevention: safety round/check completed  Taken 7/24/2023 0800 by Sherrill Sifuentes RN  Safety Promotion/Fall Prevention: patient off unit  Taken 7/24/2023 0746 by Sherrill Sifuentes RN  Safety Promotion/Fall Prevention: patient off unit  Intervention: Prevent Skin Injury  Recent Flowsheet Documentation  Taken 7/24/2023 0925 by Sherrill Sifuentes RN  Body Position: position changed independently  Intervention: Prevent and Manage VTE (Venous Thromboembolism) Risk  Recent Flowsheet Documentation  Taken 7/24/2023 0925 by Sherrill Sifuentes RN  Activity Management: ambulated in room  Range of Motion: active ROM (range of motion) encouraged  Intervention: Prevent Infection  Recent Flowsheet Documentation  Taken 7/24/2023 0925 by Sherrill Sifuentes RN  Infection Prevention: single patient room provided  Goal: Optimal Comfort and Wellbeing  Outcome: Met  Goal: Readiness for Transition of Care  Outcome: Met     Problem: Heart Failure Comorbidity  Goal: Maintenance of Heart Failure Symptom Control  Outcome: Met  Intervention: Maintain Heart Failure-Management  Recent Flowsheet Documentation  Taken 7/24/2023 0925 by Sherrill Sifuentes RN  Medication  Review/Management: medications reviewed     Problem: Seizure Disorder Comorbidity  Goal: Maintenance of Seizure Control  Outcome: Met  Intervention: Maintain Seizure-Symptom Control  Recent Flowsheet Documentation  Taken 7/24/2023 0925 by Sherrill Sifuentes RN  Seizure Precautions:   clutter-free environment maintained   side rails padded     Problem: Pain Acute  Goal: Acceptable Pain Control and Functional Ability  Outcome: Met  Intervention: Prevent or Manage Pain  Recent Flowsheet Documentation  Taken 7/24/2023 0925 by Sherrill Sifuentes RN  Medication Review/Management: medications reviewed  Intervention: Optimize Psychosocial Wellbeing  Recent Flowsheet Documentation  Taken 7/24/2023 0925 by Sherrill Sifuentes RN  Supportive Measures:   active listening utilized   verbalization of feelings encouraged  Diversional Activities: television     Problem: Chest Pain  Goal: Resolution of Chest Pain Symptoms  Outcome: Met   Goal Outcome Evaluation:  Plan of Care Reviewed With: patient           Outcome Evaluation: Patient had Myoview today, patient agreeable to wear MCOT, patient ready to discharge

## 2023-09-03 ENCOUNTER — APPOINTMENT (OUTPATIENT)
Dept: CT IMAGING | Facility: HOSPITAL | Age: 38
End: 2023-09-03
Payer: COMMERCIAL

## 2023-09-03 ENCOUNTER — HOSPITAL ENCOUNTER (EMERGENCY)
Facility: HOSPITAL | Age: 38
Discharge: HOME | End: 2023-09-03
Attending: STUDENT IN AN ORGANIZED HEALTH CARE EDUCATION/TRAINING PROGRAM | Admitting: STUDENT IN AN ORGANIZED HEALTH CARE EDUCATION/TRAINING PROGRAM
Payer: COMMERCIAL

## 2023-09-03 VITALS
OXYGEN SATURATION: 99 % | HEART RATE: 88 BPM | TEMPERATURE: 98 F | BODY MASS INDEX: 32.58 KG/M2 | RESPIRATION RATE: 16 BRPM | WEIGHT: 220 LBS | DIASTOLIC BLOOD PRESSURE: 85 MMHG | HEIGHT: 69 IN | SYSTOLIC BLOOD PRESSURE: 120 MMHG

## 2023-09-03 DIAGNOSIS — R10.9 ABDOMINAL WALL PAIN: Primary | ICD-10-CM

## 2023-09-03 LAB
ALBUMIN SERPL-MCNC: 3.9 G/DL (ref 3.5–5.2)
ALBUMIN/GLOB SERPL: 1.6 G/DL
ALP SERPL-CCNC: 104 U/L (ref 39–117)
ALT SERPL W P-5'-P-CCNC: 21 U/L (ref 1–33)
ANION GAP SERPL CALCULATED.3IONS-SCNC: 8 MMOL/L (ref 5–15)
AST SERPL-CCNC: 25 U/L (ref 1–32)
B-HCG UR QL: NEGATIVE
BASOPHILS # BLD AUTO: 0.05 10*3/MM3 (ref 0–0.2)
BASOPHILS NFR BLD AUTO: 0.5 % (ref 0–1.5)
BILIRUB SERPL-MCNC: 0.2 MG/DL (ref 0–1.2)
BILIRUB UR QL STRIP: NEGATIVE
BUN SERPL-MCNC: 9 MG/DL (ref 6–20)
BUN/CREAT SERPL: 11.8 (ref 7–25)
CALCIUM SPEC-SCNC: 8.8 MG/DL (ref 8.6–10.5)
CHLORIDE SERPL-SCNC: 108 MMOL/L (ref 98–107)
CLARITY UR: CLEAR
CO2 SERPL-SCNC: 23 MMOL/L (ref 22–29)
COLOR UR: YELLOW
CREAT SERPL-MCNC: 0.76 MG/DL (ref 0.57–1)
DEPRECATED RDW RBC AUTO: 44.6 FL (ref 37–54)
EGFRCR SERPLBLD CKD-EPI 2021: 104.3 ML/MIN/1.73
EOSINOPHIL # BLD AUTO: 0.86 10*3/MM3 (ref 0–0.4)
EOSINOPHIL NFR BLD AUTO: 8.7 % (ref 0.3–6.2)
ERYTHROCYTE [DISTWIDTH] IN BLOOD BY AUTOMATED COUNT: 14 % (ref 12.3–15.4)
GLOBULIN UR ELPH-MCNC: 2.5 GM/DL
GLUCOSE SERPL-MCNC: 138 MG/DL (ref 65–99)
GLUCOSE UR STRIP-MCNC: NEGATIVE MG/DL
HCT VFR BLD AUTO: 37.4 % (ref 34–46.6)
HGB BLD-MCNC: 12.2 G/DL (ref 12–15.9)
HGB UR QL STRIP.AUTO: NEGATIVE
IMM GRANULOCYTES # BLD AUTO: 0.02 10*3/MM3 (ref 0–0.05)
IMM GRANULOCYTES NFR BLD AUTO: 0.2 % (ref 0–0.5)
KETONES UR QL STRIP: NEGATIVE
LEUKOCYTE ESTERASE UR QL STRIP.AUTO: NEGATIVE
LIPASE SERPL-CCNC: 29 U/L (ref 13–60)
LYMPHOCYTES # BLD AUTO: 2.51 10*3/MM3 (ref 0.7–3.1)
LYMPHOCYTES NFR BLD AUTO: 25.3 % (ref 19.6–45.3)
MCH RBC QN AUTO: 28.5 PG (ref 26.6–33)
MCHC RBC AUTO-ENTMCNC: 32.6 G/DL (ref 31.5–35.7)
MCV RBC AUTO: 87.4 FL (ref 79–97)
MONOCYTES # BLD AUTO: 0.34 10*3/MM3 (ref 0.1–0.9)
MONOCYTES NFR BLD AUTO: 3.4 % (ref 5–12)
NEUTROPHILS NFR BLD AUTO: 6.13 10*3/MM3 (ref 1.7–7)
NEUTROPHILS NFR BLD AUTO: 61.9 % (ref 42.7–76)
NITRITE UR QL STRIP: NEGATIVE
PH UR STRIP.AUTO: 5.5 [PH] (ref 5–8)
PLATELET # BLD AUTO: 311 10*3/MM3 (ref 140–450)
PMV BLD AUTO: 9.2 FL (ref 6–12)
POTASSIUM SERPL-SCNC: 3.4 MMOL/L (ref 3.5–5.2)
PROT SERPL-MCNC: 6.4 G/DL (ref 6–8.5)
PROT UR QL STRIP: NEGATIVE
RBC # BLD AUTO: 4.28 10*6/MM3 (ref 3.77–5.28)
SODIUM SERPL-SCNC: 139 MMOL/L (ref 136–145)
SP GR UR STRIP: 1.02 (ref 1–1.03)
UROBILINOGEN UR QL STRIP: NORMAL
WBC NRBC COR # BLD: 9.91 10*3/MM3 (ref 3.4–10.8)

## 2023-09-03 PROCEDURE — 74177 CT ABD & PELVIS W/CONTRAST: CPT

## 2023-09-03 PROCEDURE — 25510000001 IOPAMIDOL PER 1 ML: Performed by: STUDENT IN AN ORGANIZED HEALTH CARE EDUCATION/TRAINING PROGRAM

## 2023-09-03 PROCEDURE — 81025 URINE PREGNANCY TEST: CPT | Performed by: STUDENT IN AN ORGANIZED HEALTH CARE EDUCATION/TRAINING PROGRAM

## 2023-09-03 PROCEDURE — 96375 TX/PRO/DX INJ NEW DRUG ADDON: CPT

## 2023-09-03 PROCEDURE — 80053 COMPREHEN METABOLIC PANEL: CPT | Performed by: STUDENT IN AN ORGANIZED HEALTH CARE EDUCATION/TRAINING PROGRAM

## 2023-09-03 PROCEDURE — 25010000002 KETOROLAC TROMETHAMINE PER 15 MG: Performed by: STUDENT IN AN ORGANIZED HEALTH CARE EDUCATION/TRAINING PROGRAM

## 2023-09-03 PROCEDURE — 25010000002 MORPHINE PER 10 MG: Performed by: STUDENT IN AN ORGANIZED HEALTH CARE EDUCATION/TRAINING PROGRAM

## 2023-09-03 PROCEDURE — 99285 EMERGENCY DEPT VISIT HI MDM: CPT

## 2023-09-03 PROCEDURE — 81003 URINALYSIS AUTO W/O SCOPE: CPT | Performed by: STUDENT IN AN ORGANIZED HEALTH CARE EDUCATION/TRAINING PROGRAM

## 2023-09-03 PROCEDURE — 83690 ASSAY OF LIPASE: CPT | Performed by: STUDENT IN AN ORGANIZED HEALTH CARE EDUCATION/TRAINING PROGRAM

## 2023-09-03 PROCEDURE — 96374 THER/PROPH/DIAG INJ IV PUSH: CPT

## 2023-09-03 PROCEDURE — 85025 COMPLETE CBC W/AUTO DIFF WBC: CPT | Performed by: STUDENT IN AN ORGANIZED HEALTH CARE EDUCATION/TRAINING PROGRAM

## 2023-09-03 RX ORDER — KETOROLAC TROMETHAMINE 15 MG/ML
15 INJECTION, SOLUTION INTRAMUSCULAR; INTRAVENOUS ONCE
Status: COMPLETED | OUTPATIENT
Start: 2023-09-03 | End: 2023-09-03

## 2023-09-03 RX ORDER — SODIUM CHLORIDE 0.9 % (FLUSH) 0.9 %
10 SYRINGE (ML) INJECTION AS NEEDED
Status: DISCONTINUED | OUTPATIENT
Start: 2023-09-03 | End: 2023-09-03 | Stop reason: HOSPADM

## 2023-09-03 RX ADMIN — MORPHINE SULFATE 4 MG: 4 INJECTION, SOLUTION INTRAMUSCULAR; INTRAVENOUS at 17:35

## 2023-09-03 RX ADMIN — IOPAMIDOL 100 ML: 755 INJECTION, SOLUTION INTRAVENOUS at 17:29

## 2023-09-03 RX ADMIN — KETOROLAC TROMETHAMINE 15 MG: 15 INJECTION, SOLUTION INTRAMUSCULAR; INTRAVENOUS at 16:04

## 2023-09-03 NOTE — DISCHARGE INSTRUCTIONS
You have been seen and evaluated in the emergency department for your abdominal pain. Your blood work was normal. The CT of your abdomen was also normal. Please follow up with your family physician for re-evaluation of your pain. If you are over the age of 50 and never had a colonoscopy please follow up with gastroenterology as well for routine screening purposes.      Return to the ER immediately for severe or worsening symptoms or for the development of any new worrisome symptoms including but not limited to chest pain, inability to tolerate fluids by mouth, fever (temperature greater than 100.3 degrees), vomiting blood, bloody or black stools, or the inability to pass stools or gas.   10 Hour(s) 44 Minute(s)
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
## 2023-09-03 NOTE — Clinical Note
[facility] FSED [place] [address] [place] [address] [place] [address]  Phone: [phone]    Cherie Louis was seen and treated in our emergency department on 9/3/2023.  She may return to work on 09/05/2023.         Thank you for choosing [facility].    Patricia Camarena, DO

## 2023-12-02 ENCOUNTER — APPOINTMENT (OUTPATIENT)
Dept: GENERAL RADIOLOGY | Facility: HOSPITAL | Age: 38
End: 2023-12-02
Payer: COMMERCIAL

## 2023-12-02 ENCOUNTER — HOSPITAL ENCOUNTER (EMERGENCY)
Facility: HOSPITAL | Age: 38
Discharge: HOME | End: 2023-12-02
Attending: EMERGENCY MEDICINE
Payer: COMMERCIAL

## 2023-12-02 VITALS
SYSTOLIC BLOOD PRESSURE: 133 MMHG | BODY MASS INDEX: 33.33 KG/M2 | WEIGHT: 225 LBS | TEMPERATURE: 98.1 F | DIASTOLIC BLOOD PRESSURE: 74 MMHG | HEART RATE: 106 BPM | HEIGHT: 69 IN | RESPIRATION RATE: 18 BRPM | OXYGEN SATURATION: 97 %

## 2023-12-02 DIAGNOSIS — J01.90 ACUTE SINUSITIS, RECURRENCE NOT SPECIFIED, UNSPECIFIED LOCATION: Primary | ICD-10-CM

## 2023-12-02 LAB
ALBUMIN SERPL-MCNC: 4.4 G/DL (ref 3.5–5.2)
ALBUMIN/GLOB SERPL: 1.5 G/DL
ALP SERPL-CCNC: 122 U/L (ref 39–117)
ALT SERPL W P-5'-P-CCNC: 17 U/L (ref 1–33)
ANION GAP SERPL CALCULATED.3IONS-SCNC: 8.6 MMOL/L (ref 5–15)
AST SERPL-CCNC: 20 U/L (ref 1–32)
BASOPHILS # BLD AUTO: 0.07 10*3/MM3 (ref 0–0.2)
BASOPHILS NFR BLD AUTO: 0.6 % (ref 0–1.5)
BILIRUB SERPL-MCNC: 0.2 MG/DL (ref 0–1.2)
BUN SERPL-MCNC: 11 MG/DL (ref 6–20)
BUN/CREAT SERPL: 15.7 (ref 7–25)
CALCIUM SPEC-SCNC: 9.8 MG/DL (ref 8.6–10.5)
CHLORIDE SERPL-SCNC: 107 MMOL/L (ref 98–107)
CO2 SERPL-SCNC: 22.4 MMOL/L (ref 22–29)
CREAT SERPL-MCNC: 0.7 MG/DL (ref 0.57–1)
DEPRECATED RDW RBC AUTO: 42.9 FL (ref 37–54)
EGFRCR SERPLBLD CKD-EPI 2021: 115.1 ML/MIN/1.73
EOSINOPHIL # BLD AUTO: 0.18 10*3/MM3 (ref 0–0.4)
EOSINOPHIL NFR BLD AUTO: 1.7 % (ref 0.3–6.2)
ERYTHROCYTE [DISTWIDTH] IN BLOOD BY AUTOMATED COUNT: 13.2 % (ref 12.3–15.4)
FLUAV SUBTYP SPEC NAA+PROBE: NOT DETECTED
FLUBV RNA ISLT QL NAA+PROBE: NOT DETECTED
GLOBULIN UR ELPH-MCNC: 2.9 GM/DL
GLUCOSE SERPL-MCNC: 91 MG/DL (ref 65–99)
HCT VFR BLD AUTO: 41.1 % (ref 34–46.6)
HGB BLD-MCNC: 13.5 G/DL (ref 12–15.9)
IMM GRANULOCYTES # BLD AUTO: 0.03 10*3/MM3 (ref 0–0.05)
IMM GRANULOCYTES NFR BLD AUTO: 0.3 % (ref 0–0.5)
LYMPHOCYTES # BLD AUTO: 3.47 10*3/MM3 (ref 0.7–3.1)
LYMPHOCYTES NFR BLD AUTO: 32 % (ref 19.6–45.3)
MCH RBC QN AUTO: 28.9 PG (ref 26.6–33)
MCHC RBC AUTO-ENTMCNC: 32.8 G/DL (ref 31.5–35.7)
MCV RBC AUTO: 88 FL (ref 79–97)
MONOCYTES # BLD AUTO: 0.5 10*3/MM3 (ref 0.1–0.9)
MONOCYTES NFR BLD AUTO: 4.6 % (ref 5–12)
NEUTROPHILS NFR BLD AUTO: 6.59 10*3/MM3 (ref 1.7–7)
NEUTROPHILS NFR BLD AUTO: 60.8 % (ref 42.7–76)
NT-PROBNP SERPL-MCNC: 72.1 PG/ML (ref 0–450)
PLATELET # BLD AUTO: 387 10*3/MM3 (ref 140–450)
PMV BLD AUTO: 8.8 FL (ref 6–12)
POTASSIUM SERPL-SCNC: 3.8 MMOL/L (ref 3.5–5.2)
PROT SERPL-MCNC: 7.3 G/DL (ref 6–8.5)
RBC # BLD AUTO: 4.67 10*6/MM3 (ref 3.77–5.28)
SARS-COV-2 RNA RESP QL NAA+PROBE: NOT DETECTED
SODIUM SERPL-SCNC: 138 MMOL/L (ref 136–145)
TROPONIN T SERPL HS-MCNC: <6 NG/L
WBC NRBC COR # BLD AUTO: 10.84 10*3/MM3 (ref 3.4–10.8)

## 2023-12-02 PROCEDURE — 83880 ASSAY OF NATRIURETIC PEPTIDE: CPT | Performed by: NURSE PRACTITIONER

## 2023-12-02 PROCEDURE — 85025 COMPLETE CBC W/AUTO DIFF WBC: CPT | Performed by: NURSE PRACTITIONER

## 2023-12-02 PROCEDURE — 93005 ELECTROCARDIOGRAM TRACING: CPT | Performed by: NURSE PRACTITIONER

## 2023-12-02 PROCEDURE — 99284 EMERGENCY DEPT VISIT MOD MDM: CPT

## 2023-12-02 PROCEDURE — 84484 ASSAY OF TROPONIN QUANT: CPT | Performed by: NURSE PRACTITIONER

## 2023-12-02 PROCEDURE — 71046 X-RAY EXAM CHEST 2 VIEWS: CPT

## 2023-12-02 PROCEDURE — 87636 SARSCOV2 & INF A&B AMP PRB: CPT

## 2023-12-02 PROCEDURE — 80053 COMPREHEN METABOLIC PANEL: CPT | Performed by: NURSE PRACTITIONER

## 2023-12-02 RX ORDER — SODIUM CHLORIDE 0.9 % (FLUSH) 0.9 %
10 SYRINGE (ML) INJECTION AS NEEDED
Status: DISCONTINUED | OUTPATIENT
Start: 2023-12-02 | End: 2023-12-02 | Stop reason: HOSPADM

## 2023-12-02 RX ORDER — AMOXICILLIN AND CLAVULANATE POTASSIUM 875; 125 MG/1; MG/1
1 TABLET, FILM COATED ORAL EVERY 12 HOURS
Qty: 14 TABLET | Refills: 0 | Status: SHIPPED | OUTPATIENT
Start: 2023-12-02

## 2023-12-02 NOTE — FSED PROVIDER NOTE
EMERGENCY DEPARTMENT ENCOUNTER    Room Number:  FABIANA/FABIANA  Date seen:  2023  Time seen: 15:09 EST  PCP: René Garcia MD  Historian: patient    Discussed/obtained information from independent historians: n/a    HPI:  Chief complaint:chest pain, SOA  A complete HPI/ROS/PMH/PSH/SH/FH are unobtainable due to: n/a  Context:Cherie Louis is a 38 y.o. female with history of A-fib, CHF, Crohn's, epilepsy who presents to the ED with c/o productive cough of thick dark sputum,  chest palpitations (moderate), CHF and fatigue.  She was diagnosed with RSV on 23 and just went back to work 2 days ago.  She feels no better and is worried about her CHF and afib.  She takes Carvedilol and Metoprolol and has taken PRN dose due to palpitations.  States she has increased ankle edema and took Bumex dose last night. Just now she states she blew her nose and thinks she ruptured her ear drum.     External (non-ED) record review:  pt seen here on 23 and diagnosed with RSV.  Pt had stress test 23 which showed EF 53%,  no evidence of ischemia.     Chronic or social conditions impacting care: n/a    ALLERGIES  Ativan [lorazepam], Compazine [prochlorperazine edisylate], and Haldol [haloperidol]    PAST MEDICAL HISTORY  Active Ambulatory Problems     Diagnosis Date Noted    Chest pain 2023     Resolved Ambulatory Problems     Diagnosis Date Noted    No Resolved Ambulatory Problems     Past Medical History:   Diagnosis Date    Atrial fibrillation     CHF (congestive heart failure)     COVID-19     Crohn's disease 2013    Epilepsy     Head injury     Hypotension     Migraines     Palpitations     PTSD (post-traumatic stress disorder)        PAST SURGICAL HISTORY  Past Surgical History:   Procedure Laterality Date    ABDOMINAL SURGERY       SECTION      LAPAROTOMY OOPHERECTOMY Right        FAMILY HISTORY  Family History   Problem Relation Age of Onset    Heart attack Mother     Lupus Mother      Heart attack Maternal Grandmother     Heart attack Maternal Grandfather        SOCIAL HISTORY  Social History     Socioeconomic History    Marital status:    Tobacco Use    Smoking status: Never    Smokeless tobacco: Never   Vaping Use    Vaping Use: Never used   Substance and Sexual Activity    Alcohol use: Not Currently     Alcohol/week: 1.0 standard drink of alcohol     Types: 1 Glasses of wine per week     Comment: yearly    Drug use: No    Sexual activity: Defer       REVIEW OF SYSTEMS  Review of Systems    All systems reviewed and negative except for those discussed in HPI.     PHYSICAL EXAM    I have reviewed the triage vital signs and nursing notes.  Vitals:    12/02/23 1354   BP: 133/74   Pulse: 106   Resp: 18   Temp: 98.1 °F (36.7 °C)   SpO2: 97%     Physical Exam    GENERAL: not distressed, appears to not feel well  HENT: nares patent, no tonsillar erythema or edema.  Bilateral TM's normal.  No sign of right TM perforation  EYES: no scleral icterus  NECK: no ROM limitations  CV: regular rhythm, regular rate, no murmur  RESPIRATORY: normal effort, CTAB, no wheezing  ABDOMEN: soft  : deferred  MUSCULOSKELETAL: no deformity  NEURO: alert, moves all extremities, follows commands  SKIN: warm, dry    LAB RESULTS  Recent Results (from the past 24 hour(s))   COVID-19 and FLU A/B PCR, 1 HR TAT - Swab, Nasopharynx    Collection Time: 12/02/23  1:57 PM    Specimen: Nasopharynx; Swab   Result Value Ref Range    COVID19 Not Detected Not Detected - Ref. Range    Influenza A PCR Not Detected Not Detected    Influenza B PCR Not Detected Not Detected   ECG 12 Lead Chest Pain    Collection Time: 12/02/23  3:28 PM   Result Value Ref Range    QT Interval 356 ms    QTC Interval 451 ms   Comprehensive Metabolic Panel    Collection Time: 12/02/23  3:34 PM    Specimen: Blood   Result Value Ref Range    Glucose 91 65 - 99 mg/dL    BUN 11 6 - 20 mg/dL    Creatinine 0.70 0.57 - 1.00 mg/dL    Sodium 138 136 - 145 mmol/L     Potassium 3.8 3.5 - 5.2 mmol/L    Chloride 107 98 - 107 mmol/L    CO2 22.4 22.0 - 29.0 mmol/L    Calcium 9.8 8.6 - 10.5 mg/dL    Total Protein 7.3 6.0 - 8.5 g/dL    Albumin 4.4 3.5 - 5.2 g/dL    ALT (SGPT) 17 1 - 33 U/L    AST (SGOT) 20 1 - 32 U/L    Alkaline Phosphatase 122 (H) 39 - 117 U/L    Total Bilirubin 0.2 0.0 - 1.2 mg/dL    Globulin 2.9 gm/dL    A/G Ratio 1.5 g/dL    BUN/Creatinine Ratio 15.7 7.0 - 25.0    Anion Gap 8.6 5.0 - 15.0 mmol/L    eGFR 115.1 >60.0 mL/min/1.73   BNP    Collection Time: 12/02/23  3:34 PM    Specimen: Blood   Result Value Ref Range    proBNP 72.1 0.0 - 450.0 pg/mL   Single High Sensitivity Troponin T    Collection Time: 12/02/23  3:34 PM    Specimen: Blood   Result Value Ref Range    HS Troponin T <6 <14 ng/L   CBC Auto Differential    Collection Time: 12/02/23  3:34 PM    Specimen: Blood   Result Value Ref Range    WBC 10.84 (H) 3.40 - 10.80 10*3/mm3    RBC 4.67 3.77 - 5.28 10*6/mm3    Hemoglobin 13.5 12.0 - 15.9 g/dL    Hematocrit 41.1 34.0 - 46.6 %    MCV 88.0 79.0 - 97.0 fL    MCH 28.9 26.6 - 33.0 pg    MCHC 32.8 31.5 - 35.7 g/dL    RDW 13.2 12.3 - 15.4 %    RDW-SD 42.9 37.0 - 54.0 fl    MPV 8.8 6.0 - 12.0 fL    Platelets 387 140 - 450 10*3/mm3    Neutrophil % 60.8 42.7 - 76.0 %    Lymphocyte % 32.0 19.6 - 45.3 %    Monocyte % 4.6 (L) 5.0 - 12.0 %    Eosinophil % 1.7 0.3 - 6.2 %    Basophil % 0.6 0.0 - 1.5 %    Immature Grans % 0.3 0.0 - 0.5 %    Neutrophils, Absolute 6.59 1.70 - 7.00 10*3/mm3    Lymphocytes, Absolute 3.47 (H) 0.70 - 3.10 10*3/mm3    Monocytes, Absolute 0.50 0.10 - 0.90 10*3/mm3    Eosinophils, Absolute 0.18 0.00 - 0.40 10*3/mm3    Basophils, Absolute 0.07 0.00 - 0.20 10*3/mm3    Immature Grans, Absolute 0.03 0.00 - 0.05 10*3/mm3       Ordered the above labs and independently interpreted results.  My findings will be discussed in the ED course or medical decision making section below    RADIOLOGY RESULTS  XR Chest 2 View    Result Date: 12/2/2023  XR CHEST 2 VW  Date of Exam: 12/2/2023 2:40 PM EST Indication: cough x1 week--productive with blood tinged sputum Comparison: Chest x-ray 11/26/2023 Findings: Normal cardiomediastinal silhouette. The lungs are clear. No pleural effusion or pneumothorax. No acute osseous findings.     Impression: No acute cardiopulmonary findings. Electronically Signed: Andres Delarosa MD  12/2/2023 3:15 PM EST  Workstation ID: [id number]      Ordered the above noted radiological studies.  Independently interpreted by me.  My findings will be discussed in the medical decision section below.     PROGRESS, DATA ANALYSIS, CONSULTS AND MEDICAL DECISION MAKING    Please note that this section constitutes my independent interpretation of clinical data including lab results, radiology, EKG's.  This constitutes my independent professional opinion regarding differential diagnosis and management of this patient.  It may include any factors such as history from outside sources, review of external records, social determinants of health, management of medications, response to those treatments, and discussions with other providers.    ED Course as of 12/02/23 1630   Sat Dec 02, 2023   1509 I viewed two-view chest x-ray in PACS.  My independent interpretation is no acute infiltrate. [EW]   1538 I viewed chest x-ray in PACS.  My independent interpretation are no acute infiltrates.    EKG          EKG time: 1528  Rhythm/Rate: 96, sinus rhythm  P waves and WI: normal WI, normal RASHIDA  QRS, axis: normal QRS and axis  ST and T waves: no acute ST/T wave abnormalities    Interpreted Contemporaneously by me, independently viewed  Compared with prior date 11/26/23, today's rate is higher.    [EW]   1610 MDM/dispo:  Pt resting comfortably.  EKG reassuring - normal rate and rhythm. BNP and HS trop normal.  No infiltrates on CXR.  Labs stable.  Viral testing negative.  Will treat for a bronchitis/sinusitis and have her be off work several more days.  I suspect she may have gone  back too soon.  [EW]      ED Course User Index  [EW] Britt Garber APRN     Orders placed during this visit:  Orders Placed This Encounter   Procedures    COVID-19 and FLU A/B PCR, 1 HR TAT - Swab, Nasopharynx    XR Chest 2 View    Comprehensive Metabolic Panel    BNP    Single High Sensitivity Troponin T    CBC Auto Differential    ECG 12 Lead Chest Pain    Blood Draw With IV Start    Insert peripheral IV    CBC & Differential    ED Acknowledgement Form Needed;            Medical Decision Making  Problems Addressed:  Acute sinusitis, recurrence not specified, unspecified location: complicated acute illness or injury    Amount and/or Complexity of Data Reviewed  Labs: ordered.  Radiology: ordered.  ECG/medicine tests: ordered.    Risk  Prescription drug management.    Pt's workup WNL.  She is not in atrial fib and has controlled, regular heart rate.  No hypoxia or CXR abnormalities.  HEENT exam normal.  No evidence of CHF or NSTEMI.  She recently had RSV and still having symptoms that sound more like sinusitis at this point.  I have prescribed abx.  RTER precautions advised.       DIAGNOSIS  Final diagnoses:   Acute sinusitis, recurrence not specified, unspecified location          Medication List        New Prescriptions      amoxicillin-clavulanate 875-125 MG per tablet  Commonly known as: AUGMENTIN  Take 1 tablet by mouth Every 12 (Twelve) Hours.               Where to Get Your Medications        These medications were sent to [facility] [id number] - [place], [place] - [address] [place] [address] [address]  - [phone]  - [phone] [address] [place] [address]      Phone: [phone]   amoxicillin-clavulanate 875-125 MG per tablet         FOLLOW-UP  René Garcia MD  [address] [place] [id number] [phone]    Schedule an appointment as soon as possible for a visit in 2 days  As needed, If symptoms worsen        Latest Documented Vital Signs:  As of 16:30  EST  BP- 133/74 HR- 106 Temp- 98.1 °F (36.7 °C) (Temporal) O2 sat- 97%    Appropriate PPE utilized throughout this patient encounter to include mask, if indicated, per current protocol. Hand hygiene was performed before donning PPE and after removal when leaving the room.    Please note that portions of this were completed with a voice recognition program.     Note Disclaimer: At [facility], we believe that sharing information builds trust and better relationships. You are receiving this note because you are receiving care at [facility] or recently visited. It is possible you will see health information before a provider has talked with you about it. This kind of information can be easy to misunderstand. To help you fully understand what it means for your health, we urge you to discuss this note with your provider.

## 2023-12-02 NOTE — DISCHARGE INSTRUCTIONS
"  Wash/sanitize common household surfaces with antibacterial wipes.  Especially door knobs, light switches.    Change bed linens and wash bath towels/washcloths    Frequent handwashing    Cough/sneeze into your sleeve    Treat fever every 6-8 hours with age appropriate Tylenol (generic acetaminophen) or Ibuprofen according to package directions.      Over-the-counter medications for symptomatic relief may include: Mucinex (maximum 3 days), Sudafed, DayQuil/NyQuil, flonase nasal spray.  If you have history of high blood pressure please use caution with these medications.  Check with pharmacist for recommendations.  Coricidin has brand \"HBP\" which is better for patient's with high blood pressure.     Over-the-counter supplements including vitamin C, zinc  may also be helpful.    Return Precautions    Although you are being discharged from the ED today, I encourage you to return for worsening symptoms.  Things can, and do, change such that treatment at home with medication may not be adequate.      Specifically, return for any of the following:    Chest pain, shortness of breath, pain or nausea and vomiting not controlled by medications provided.    Please make a follow up with your Primary Care Provider for a blood pressure recheck.       "

## 2023-12-02 NOTE — ED NOTES
Pt here today reporting she was diagnosed 1 wk ago with RSV but still not feeling well. Reports coughing up a bloody mucus plug yesterday.

## 2023-12-02 NOTE — Clinical Note
[facility] FSED [place] [address] [place] [address] [place] [address]  Phone: [phone]    Cherie Louis was seen and treated in our emergency department on 12/2/2023.  She may return to work on 12/05/2023.         Thank you for choosing [facility].    Britt Garber APRN

## 2023-12-03 LAB
QT INTERVAL: 356 MS
QTC INTERVAL: 451 MS

## 2024-06-23 ENCOUNTER — APPOINTMENT (OUTPATIENT)
Dept: CT IMAGING | Facility: HOSPITAL | Age: 39
End: 2024-06-23
Payer: COMMERCIAL

## 2024-06-23 ENCOUNTER — HOSPITAL ENCOUNTER (EMERGENCY)
Facility: HOSPITAL | Age: 39
Discharge: HOME | End: 2024-06-23
Attending: EMERGENCY MEDICINE | Admitting: EMERGENCY MEDICINE
Payer: COMMERCIAL

## 2024-06-23 VITALS
HEIGHT: 69 IN | TEMPERATURE: 98.4 F | DIASTOLIC BLOOD PRESSURE: 80 MMHG | HEART RATE: 94 BPM | OXYGEN SATURATION: 96 % | RESPIRATION RATE: 18 BRPM | WEIGHT: 225 LBS | BODY MASS INDEX: 33.33 KG/M2 | SYSTOLIC BLOOD PRESSURE: 127 MMHG

## 2024-06-23 DIAGNOSIS — K50.90 CROHN'S DISEASE WITHOUT COMPLICATION, UNSPECIFIED GASTROINTESTINAL TRACT LOCATION: ICD-10-CM

## 2024-06-23 DIAGNOSIS — K59.00 CONSTIPATION, UNSPECIFIED CONSTIPATION TYPE: Primary | ICD-10-CM

## 2024-06-23 DIAGNOSIS — A49.9 UTI (URINARY TRACT INFECTION), BACTERIAL: ICD-10-CM

## 2024-06-23 DIAGNOSIS — N39.0 UTI (URINARY TRACT INFECTION), BACTERIAL: ICD-10-CM

## 2024-06-23 LAB
ALBUMIN SERPL-MCNC: 4.1 G/DL (ref 3.5–5.2)
ALBUMIN/GLOB SERPL: 1.6 G/DL
ALP SERPL-CCNC: 130 U/L (ref 39–117)
ALT SERPL W P-5'-P-CCNC: 26 U/L (ref 1–33)
ANION GAP SERPL CALCULATED.3IONS-SCNC: 9.7 MMOL/L (ref 5–15)
AST SERPL-CCNC: 29 U/L (ref 1–32)
B-HCG UR QL: NEGATIVE
BASOPHILS # BLD AUTO: 0.04 10*3/MM3 (ref 0–0.2)
BASOPHILS NFR BLD AUTO: 0.4 % (ref 0–1.5)
BILIRUB SERPL-MCNC: 0.2 MG/DL (ref 0–1.2)
BILIRUB UR QL STRIP: NEGATIVE
BUN SERPL-MCNC: 13 MG/DL (ref 6–20)
BUN/CREAT SERPL: 18.3 (ref 7–25)
CALCIUM SPEC-SCNC: 9.7 MG/DL (ref 8.6–10.5)
CHLORIDE SERPL-SCNC: 108 MMOL/L (ref 98–107)
CLARITY UR: CLEAR
CO2 SERPL-SCNC: 20.3 MMOL/L (ref 22–29)
COLOR UR: YELLOW
CREAT SERPL-MCNC: 0.71 MG/DL (ref 0.57–1)
DEPRECATED RDW RBC AUTO: 43.1 FL (ref 37–54)
EGFRCR SERPLBLD CKD-EPI 2021: 113.2 ML/MIN/1.73
EOSINOPHIL # BLD AUTO: 0.19 10*3/MM3 (ref 0–0.4)
EOSINOPHIL NFR BLD AUTO: 2 % (ref 0.3–6.2)
ERYTHROCYTE [DISTWIDTH] IN BLOOD BY AUTOMATED COUNT: 13.3 % (ref 12.3–15.4)
GLOBULIN UR ELPH-MCNC: 2.6 GM/DL
GLUCOSE SERPL-MCNC: 137 MG/DL (ref 65–99)
GLUCOSE UR STRIP-MCNC: NEGATIVE MG/DL
HCT VFR BLD AUTO: 38.7 % (ref 34–46.6)
HGB BLD-MCNC: 12.8 G/DL (ref 12–15.9)
HGB UR QL STRIP.AUTO: ABNORMAL
IMM GRANULOCYTES # BLD AUTO: 0.03 10*3/MM3 (ref 0–0.05)
IMM GRANULOCYTES NFR BLD AUTO: 0.3 % (ref 0–0.5)
KETONES UR QL STRIP: NEGATIVE
LEUKOCYTE ESTERASE UR QL STRIP.AUTO: ABNORMAL
LYMPHOCYTES # BLD AUTO: 2.45 10*3/MM3 (ref 0.7–3.1)
LYMPHOCYTES NFR BLD AUTO: 26 % (ref 19.6–45.3)
MCH RBC QN AUTO: 28.9 PG (ref 26.6–33)
MCHC RBC AUTO-ENTMCNC: 33.1 G/DL (ref 31.5–35.7)
MCV RBC AUTO: 87.4 FL (ref 79–97)
MONOCYTES # BLD AUTO: 0.38 10*3/MM3 (ref 0.1–0.9)
MONOCYTES NFR BLD AUTO: 4 % (ref 5–12)
NEUTROPHILS NFR BLD AUTO: 6.32 10*3/MM3 (ref 1.7–7)
NEUTROPHILS NFR BLD AUTO: 67.3 % (ref 42.7–76)
NITRITE UR QL STRIP: NEGATIVE
PH UR STRIP.AUTO: 7 [PH] (ref 5–8)
PLATELET # BLD AUTO: 359 10*3/MM3 (ref 140–450)
PMV BLD AUTO: 8.9 FL (ref 6–12)
POTASSIUM SERPL-SCNC: 3.7 MMOL/L (ref 3.5–5.2)
PROT SERPL-MCNC: 6.7 G/DL (ref 6–8.5)
PROT UR QL STRIP: NEGATIVE
RBC # BLD AUTO: 4.43 10*6/MM3 (ref 3.77–5.28)
SODIUM SERPL-SCNC: 138 MMOL/L (ref 136–145)
SP GR UR STRIP: 1.02 (ref 1–1.03)
UROBILINOGEN UR QL STRIP: ABNORMAL
WBC NRBC COR # BLD AUTO: 9.41 10*3/MM3 (ref 3.4–10.8)

## 2024-06-23 PROCEDURE — 85025 COMPLETE CBC W/AUTO DIFF WBC: CPT | Performed by: EMERGENCY MEDICINE

## 2024-06-23 PROCEDURE — 81003 URINALYSIS AUTO W/O SCOPE: CPT | Performed by: EMERGENCY MEDICINE

## 2024-06-23 PROCEDURE — 25810000003 SODIUM CHLORIDE 0.9 % SOLUTION: Performed by: EMERGENCY MEDICINE

## 2024-06-23 PROCEDURE — 80053 COMPREHEN METABOLIC PANEL: CPT | Performed by: EMERGENCY MEDICINE

## 2024-06-23 PROCEDURE — 81025 URINE PREGNANCY TEST: CPT | Performed by: EMERGENCY MEDICINE

## 2024-06-23 PROCEDURE — 99285 EMERGENCY DEPT VISIT HI MDM: CPT

## 2024-06-23 PROCEDURE — 25510000001 IOPAMIDOL PER 1 ML: Performed by: EMERGENCY MEDICINE

## 2024-06-23 PROCEDURE — 74177 CT ABD & PELVIS W/CONTRAST: CPT

## 2024-06-23 PROCEDURE — 99284 EMERGENCY DEPT VISIT MOD MDM: CPT | Performed by: EMERGENCY MEDICINE

## 2024-06-23 RX ORDER — LACTULOSE 10 G/15ML
30 SOLUTION ORAL ONCE
Status: COMPLETED | OUTPATIENT
Start: 2024-06-23 | End: 2024-06-23

## 2024-06-23 RX ORDER — CEFUROXIME AXETIL 500 MG/1
500 TABLET ORAL 2 TIMES DAILY
Qty: 14 TABLET | Refills: 0 | Status: SHIPPED | OUTPATIENT
Start: 2024-06-23 | End: 2024-06-30

## 2024-06-23 RX ORDER — ONDANSETRON 4 MG/1
4 TABLET, FILM COATED ORAL 4 TIMES DAILY PRN
Qty: 25 TABLET | Refills: 0 | Status: SHIPPED | OUTPATIENT
Start: 2024-06-23

## 2024-06-23 RX ORDER — LACTULOSE 10 G/15ML
20 SOLUTION ORAL 2 TIMES DAILY
Qty: 473 ML | Refills: 0 | Status: SHIPPED | OUTPATIENT
Start: 2024-06-23

## 2024-06-23 RX ORDER — ACETAMINOPHEN 500 MG
1000 TABLET ORAL ONCE
Status: COMPLETED | OUTPATIENT
Start: 2024-06-23 | End: 2024-06-23

## 2024-06-23 RX ADMIN — SODIUM CHLORIDE 1000 ML: 9 INJECTION, SOLUTION INTRAVENOUS at 14:42

## 2024-06-23 RX ADMIN — IOPAMIDOL 100 ML: 755 INJECTION, SOLUTION INTRAVENOUS at 15:11

## 2024-06-23 RX ADMIN — LACTULOSE 30 G: 20 SOLUTION ORAL at 16:19

## 2024-06-23 RX ADMIN — ACETAMINOPHEN 1000 MG: 500 TABLET, FILM COATED ORAL at 15:26

## 2024-06-23 NOTE — Clinical Note
[facility] FSED [place] [address] [place] [address] [place] [address]  Phone: [phone]    Cherie Louis was seen and treated in our emergency department on 6/23/2024.  She may return to work on 06/26/2024.         Thank you for choosing [facility].    Jaswinder Atkins MD

## 2024-06-23 NOTE — FSED PROVIDER NOTE
"Subjective   History of Present Illness  Patient with complaint of abdominal pain left side for several days. Last normal bowel movement over one week ago. Patient used otc laxatives with small amount of stool today. Abdominal lizeth colicky. No vomiting, hematemesis, hematochezia, melena. No other complaints. No hx of chron's related fistulas. Patient also experiencing urinary frequency and urgency for several days. No precipitating or relieving factors. No pattern to symptoms.     History provided by:  Patient   used: No        Review of Systems   All other systems reviewed and are negative.      Past Medical History:   Diagnosis Date    Atrial fibrillation     CHF (congestive heart failure)     COVID-19     Crohn's disease 2013    Epilepsy     Head injury     Hypotension     Migraines     Palpitations     PTSD (post-traumatic stress disorder)        Allergies   Allergen Reactions    Ativan [Lorazepam] Anaphylaxis    Compazine [Prochlorperazine Edisylate] Anaphylaxis    Haldol [Haloperidol] Shortness Of Breath and Other (See Comments)     Pt states \"It made me feel like I had a 3rd degree sunburn\"       Past Surgical History:   Procedure Laterality Date    ABDOMINAL SURGERY       SECTION      LAPAROTOMY OOPHERECTOMY Right        Family History   Problem Relation Age of Onset    Heart attack Mother     Lupus Mother     Heart attack Maternal Grandmother     Heart attack Maternal Grandfather        Social History     Socioeconomic History    Marital status:    Tobacco Use    Smoking status: Never    Smokeless tobacco: Never   Vaping Use    Vaping status: Never Used   Substance and Sexual Activity    Alcohol use: Not Currently     Alcohol/week: 1.0 standard drink of alcohol     Types: 1 Glasses of wine per week     Comment: yearly    Drug use: No    Sexual activity: Defer           Objective   Physical Exam  Vitals and nursing note reviewed.   Constitutional:       General: She is not " in acute distress.     Appearance: She is well-developed. She is not ill-appearing, toxic-appearing or diaphoretic.   Pulmonary:      Effort: Pulmonary effort is normal.      Breath sounds: Normal breath sounds.   Abdominal:      General: Abdomen is protuberant. Bowel sounds are normal.      Palpations: Abdomen is soft.      Tenderness:  in the left lower quadrant There is no right CVA tenderness, left CVA tenderness, guarding or rebound. Negative signs include Whitehead's sign, Rovsing's sign, McBurney's sign, psoas sign and obturator sign.      Hernia: No hernia is present.   Skin:     General: Skin is warm.      Capillary Refill: Capillary refill takes less than 2 seconds.   Neurological:      General: No focal deficit present.      Mental Status: She is alert and oriented to person, place, and time.   Psychiatric:         Mood and Affect: Mood normal.         Behavior: Behavior normal.         Procedures           ED Course                                           Medical Decision Making  Concern for colitis, sbo, uti, metabolic derangement. D/w patient. Agree with plan.    Problems Addressed:  Constipation, unspecified constipation type: complicated acute illness or injury  Crohn's disease without complication, unspecified gastrointestinal tract location: complicated acute illness or injury  UTI (urinary tract infection), bacterial: complicated acute illness or injury    Amount and/or Complexity of Data Reviewed  Labs: ordered.  Radiology: ordered.    Risk  OTC drugs.  Prescription drug management.        Final diagnoses:   Constipation, unspecified constipation type   UTI (urinary tract infection), bacterial   Crohn's disease without complication, unspecified gastrointestinal tract location       ED Disposition  ED Disposition       ED Disposition   Discharge    Condition   Stable    Comment   --               René Garcia MD  [address] [address]  [phone]    Schedule an  appointment as soon as possible for a visit            Medication List        New Prescriptions      cefuroxime 500 MG tablet  Commonly known as: CEFTIN  Take 1 tablet by mouth 2 (Two) Times a Day for 7 days.     lactulose 10 GM/15ML solution  Commonly known as: CHRONULAC  Take 30 mL by mouth 2 (Two) Times a Day.     ondansetron 4 MG tablet  Commonly known as: ZOFRAN  Take 1 tablet by mouth 4 (Four) Times a Day As Needed for Nausea or Vomiting.            Stop      amoxicillin-clavulanate 875-125 MG per tablet  Commonly known as: AUGMENTIN     cyclobenzaprine 10 MG tablet  Commonly known as: FLEXERIL     ondansetron ODT 8 MG disintegrating tablet  Commonly known as: ZOFRAN-ODT     promethazine 25 MG tablet  Commonly known as: PHENERGAN               Where to Get Your Medications        These medications were sent to [id number] [place], [place] - [address] [place] [address] [address]  - [phone]  - [phone] [address] [place] [address]      Phone: [phone]   cefuroxime 500 MG tablet  lactulose 10 GM/15ML solution  ondansetron 4 MG tablet

## 2024-06-23 NOTE — Clinical Note
[facility] FSED [id number] [place] [address] [place] [address]  Phone: [phone]    Cherie Louis was seen and treated in our emergency department on 6/23/2024.  She may return to work on 06/24/2024.         Thank you for choosing [facility].    Jaswinder Atkins MD

## 2025-07-22 ENCOUNTER — HOSPITAL ENCOUNTER (EMERGENCY)
Facility: HOSPITAL | Age: 40
Discharge: HOME | End: 2025-07-22
Attending: EMERGENCY MEDICINE | Admitting: EMERGENCY MEDICINE
Payer: COMMERCIAL

## 2025-07-22 VITALS
HEART RATE: 103 BPM | RESPIRATION RATE: 18 BRPM | SYSTOLIC BLOOD PRESSURE: 110 MMHG | BODY MASS INDEX: 36.01 KG/M2 | DIASTOLIC BLOOD PRESSURE: 61 MMHG | HEIGHT: 69 IN | WEIGHT: 243.1 LBS | TEMPERATURE: 98.1 F | OXYGEN SATURATION: 100 %

## 2025-07-22 DIAGNOSIS — R55 NEAR SYNCOPE: Primary | ICD-10-CM

## 2025-07-22 DIAGNOSIS — E83.41 HYPERMAGNESEMIA: ICD-10-CM

## 2025-07-22 DIAGNOSIS — E86.0 DEHYDRATION: ICD-10-CM

## 2025-07-22 LAB
ALBUMIN SERPL-MCNC: 4.4 G/DL (ref 3.5–5.2)
ALBUMIN/GLOB SERPL: 1.5 G/DL
ALP SERPL-CCNC: 108 U/L (ref 39–117)
ALT SERPL W P-5'-P-CCNC: 16 U/L (ref 1–33)
ANION GAP SERPL CALCULATED.3IONS-SCNC: 9.8 MMOL/L (ref 5–15)
AST SERPL-CCNC: 16 U/L (ref 1–32)
B-HCG UR QL: NEGATIVE
BACTERIA UR QL AUTO: ABNORMAL /HPF
BASOPHILS # BLD AUTO: 0.1 10*3/MM3 (ref 0–0.2)
BASOPHILS NFR BLD AUTO: 0.7 % (ref 0–1.5)
BILIRUB SERPL-MCNC: 0.2 MG/DL (ref 0–1.2)
BILIRUB UR QL STRIP: NEGATIVE
BUN SERPL-MCNC: 13.1 MG/DL (ref 6–20)
BUN/CREAT SERPL: 17.5 (ref 7–25)
CALCIUM SPEC-SCNC: 10.2 MG/DL (ref 8.6–10.5)
CHLORIDE SERPL-SCNC: 107 MMOL/L (ref 98–107)
CLARITY UR: ABNORMAL
CO2 SERPL-SCNC: 19.2 MMOL/L (ref 22–29)
COD CRY URNS QL: ABNORMAL /HPF
COLOR UR: ABNORMAL
CREAT SERPL-MCNC: 0.75 MG/DL (ref 0.57–1)
DEPRECATED RDW RBC AUTO: 43.3 FL (ref 37–54)
EGFRCR SERPLBLD CKD-EPI 2021: 104.7 ML/MIN/1.73
EOSINOPHIL # BLD AUTO: 0.26 10*3/MM3 (ref 0–0.4)
EOSINOPHIL NFR BLD AUTO: 1.9 % (ref 0.3–6.2)
ERYTHROCYTE [DISTWIDTH] IN BLOOD BY AUTOMATED COUNT: 13.5 % (ref 12.3–15.4)
GEN 5 1HR TROPONIN T REFLEX: <6 NG/L
GLOBULIN UR ELPH-MCNC: 2.9 GM/DL
GLUCOSE SERPL-MCNC: 104 MG/DL (ref 65–99)
GLUCOSE UR STRIP-MCNC: NEGATIVE MG/DL
HCT VFR BLD AUTO: 39.2 % (ref 34–46.6)
HGB BLD-MCNC: 13 G/DL (ref 12–15.9)
HGB UR QL STRIP.AUTO: NEGATIVE
HOLD SPECIMEN: NORMAL
HYALINE CASTS UR QL AUTO: ABNORMAL /LPF
IMM GRANULOCYTES # BLD AUTO: 0.07 10*3/MM3 (ref 0–0.05)
IMM GRANULOCYTES NFR BLD AUTO: 0.5 % (ref 0–0.5)
KETONES UR QL STRIP: NEGATIVE
LEUKOCYTE ESTERASE UR QL STRIP.AUTO: ABNORMAL
LYMPHOCYTES # BLD AUTO: 3.78 10*3/MM3 (ref 0.7–3.1)
LYMPHOCYTES NFR BLD AUTO: 27.3 % (ref 19.6–45.3)
MAGNESIUM SERPL-MCNC: 3.2 MG/DL (ref 1.6–2.6)
MCH RBC QN AUTO: 29.1 PG (ref 26.6–33)
MCHC RBC AUTO-ENTMCNC: 33.2 G/DL (ref 31.5–35.7)
MCV RBC AUTO: 87.7 FL (ref 79–97)
MONOCYTES # BLD AUTO: 0.67 10*3/MM3 (ref 0.1–0.9)
MONOCYTES NFR BLD AUTO: 4.8 % (ref 5–12)
NEUTROPHILS NFR BLD AUTO: 64.8 % (ref 42.7–76)
NEUTROPHILS NFR BLD AUTO: 8.98 10*3/MM3 (ref 1.7–7)
NITRITE UR QL STRIP: NEGATIVE
NT-PROBNP SERPL-MCNC: <36 PG/ML (ref 0–450)
PH UR STRIP.AUTO: 5.5 [PH] (ref 5–8)
PLATELET # BLD AUTO: 391 10*3/MM3 (ref 140–450)
PMV BLD AUTO: 9 FL (ref 6–12)
POTASSIUM SERPL-SCNC: 3.7 MMOL/L (ref 3.5–5.2)
PROT SERPL-MCNC: 7.3 G/DL (ref 6–8.5)
PROT UR QL STRIP: ABNORMAL
RBC # BLD AUTO: 4.47 10*6/MM3 (ref 3.77–5.28)
RBC # UR STRIP: ABNORMAL /HPF
REF LAB TEST METHOD: ABNORMAL
SODIUM SERPL-SCNC: 136 MMOL/L (ref 136–145)
SP GR UR STRIP: >=1.03 (ref 1–1.03)
SQUAMOUS #/AREA URNS HPF: ABNORMAL /HPF
TROPONIN T NUMERIC DELTA: NORMAL
TROPONIN T SERPL HS-MCNC: <6 NG/L
UROBILINOGEN UR QL STRIP: ABNORMAL
WBC # UR STRIP: ABNORMAL /HPF
WBC NRBC COR # BLD AUTO: 13.86 10*3/MM3 (ref 3.4–10.8)

## 2025-07-22 PROCEDURE — 83880 ASSAY OF NATRIURETIC PEPTIDE: CPT

## 2025-07-22 PROCEDURE — 36415 COLL VENOUS BLD VENIPUNCTURE: CPT

## 2025-07-22 PROCEDURE — 93005 ELECTROCARDIOGRAM TRACING: CPT

## 2025-07-22 PROCEDURE — 83735 ASSAY OF MAGNESIUM: CPT

## 2025-07-22 PROCEDURE — 25010000002 FAMOTIDINE 10 MG/ML SOLUTION

## 2025-07-22 PROCEDURE — 96374 THER/PROPH/DIAG INJ IV PUSH: CPT

## 2025-07-22 PROCEDURE — 85025 COMPLETE CBC W/AUTO DIFF WBC: CPT

## 2025-07-22 PROCEDURE — 96375 TX/PRO/DX INJ NEW DRUG ADDON: CPT

## 2025-07-22 PROCEDURE — 81001 URINALYSIS AUTO W/SCOPE: CPT

## 2025-07-22 PROCEDURE — 81025 URINE PREGNANCY TEST: CPT | Performed by: EMERGENCY MEDICINE

## 2025-07-22 PROCEDURE — 80053 COMPREHEN METABOLIC PANEL: CPT

## 2025-07-22 PROCEDURE — 25010000002 KETOROLAC TROMETHAMINE PER 15 MG

## 2025-07-22 PROCEDURE — 25010000002 ONDANSETRON PER 1 MG

## 2025-07-22 PROCEDURE — 84484 ASSAY OF TROPONIN QUANT: CPT

## 2025-07-22 PROCEDURE — 99283 EMERGENCY DEPT VISIT LOW MDM: CPT

## 2025-07-22 PROCEDURE — 25810000003 SODIUM CHLORIDE 0.9 % SOLUTION

## 2025-07-22 PROCEDURE — 96361 HYDRATE IV INFUSION ADD-ON: CPT

## 2025-07-22 RX ORDER — KETOROLAC TROMETHAMINE 30 MG/ML
15 INJECTION, SOLUTION INTRAMUSCULAR; INTRAVENOUS ONCE
Status: COMPLETED | OUTPATIENT
Start: 2025-07-22 | End: 2025-07-22

## 2025-07-22 RX ORDER — FAMOTIDINE 10 MG/ML
20 INJECTION, SOLUTION INTRAVENOUS ONCE
Status: COMPLETED | OUTPATIENT
Start: 2025-07-22 | End: 2025-07-22

## 2025-07-22 RX ORDER — ONDANSETRON 2 MG/ML
4 INJECTION INTRAMUSCULAR; INTRAVENOUS ONCE
Status: COMPLETED | OUTPATIENT
Start: 2025-07-22 | End: 2025-07-22

## 2025-07-22 RX ORDER — SODIUM CHLORIDE 0.9 % (FLUSH) 0.9 %
10 SYRINGE (ML) INJECTION AS NEEDED
Status: DISCONTINUED | OUTPATIENT
Start: 2025-07-22 | End: 2025-07-22 | Stop reason: HOSPADM

## 2025-07-22 RX ORDER — SUCRALFATE ORAL 1 G/10ML
1 SUSPENSION ORAL 2 TIMES DAILY
Qty: 140 ML | Refills: 0 | Status: SHIPPED | OUTPATIENT
Start: 2025-07-22 | End: 2025-07-29

## 2025-07-22 RX ORDER — HYDROCHLOROTHIAZIDE 12.5 MG/1
12.5 TABLET ORAL DAILY
Qty: 5 TABLET | Refills: 0 | Status: SHIPPED | OUTPATIENT
Start: 2025-07-22 | End: 2025-07-27

## 2025-07-22 RX ADMIN — SODIUM CHLORIDE 1000 ML: 9 INJECTION, SOLUTION INTRAVENOUS at 19:08

## 2025-07-22 RX ADMIN — KETOROLAC TROMETHAMINE 15 MG: 30 INJECTION INTRAMUSCULAR; INTRAVENOUS at 19:45

## 2025-07-22 RX ADMIN — ONDANSETRON 4 MG: 2 INJECTION, SOLUTION INTRAMUSCULAR; INTRAVENOUS at 19:45

## 2025-07-22 RX ADMIN — FAMOTIDINE 20 MG: 10 INJECTION INTRAVENOUS at 19:09

## 2025-07-22 NOTE — Clinical Note
[facility] FS[facility] [address] [address] [address] [place] [address] [place] [address]  Phone: [phone]    Cherie Louis was seen and treated in our emergency department on 7/22/2025.  She may return to work on 07/24/2025.         Thank you for choosing [facility].    Adrian Traylor Jr., JOHANNE

## 2025-07-22 NOTE — FSED PROVIDER NOTE
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  Reports that she tried to leave work several times but her employer would not let her leave reports that she called HR and father was able to leave the hospital after putting her foot down.  Reports that she is on a diuretic, denies vomiting and diarrhea denies shortness of breath reports pain to her epigastrium that is intermittent in nature but when does present is sharp reports that she is on Protonix for history of GERD.  Reports recently having RSV influenza A, reports that she is on both Tamiflu and Paxlovid.  Patient reports that her PCP has had her on Bumex in the past but that she no longer has this diuretic.  Patient is concerned about a 9 pound weight gain over the last week.        Review of Systems   All other systems reviewed and are negative.      Past Medical History:   Diagnosis Date    Atrial fibrillation     CHF (congestive heart failure)     COVID-19     Crohn's disease     Epilepsy     Head injury     Hypotension     Migraines     Palpitations     PTSD (post-traumatic stress disorder)        Allergies   Allergen Reactions    Compazine [Prochlorperazine Edisylate] Anaphylaxis    Haloperidol Other (See Comments), Shortness Of Breath and Unknown - Low Severity     Pt states \"It made me feel like I had a 3rd degree sunburn\"    Lorazepam Anaphylaxis    Prochlorperazine Anaphylaxis    Prochlorperazine Maleate Anaphylaxis    Metronidazole Nausea And Vomiting       Past Surgical History:   Procedure Laterality Date    ABDOMINAL SURGERY       SECTION      LAPAROTOMY OOPHERECTOMY Right        Family " History   Problem Relation Age of Onset    Heart attack Mother     Lupus Mother     Heart attack Maternal Grandmother     Heart attack Maternal Grandfather        Social History     Socioeconomic History    Marital status:    Tobacco Use    Smoking status: Never    Smokeless tobacco: Never   Vaping Use    Vaping status: Never Used   Substance and Sexual Activity    Alcohol use: Not Currently     Alcohol/week: 1.0 standard drink of alcohol     Types: 1 Glasses of wine per week     Comment: yearly    Drug use: No    Sexual activity: Defer           Objective   Physical Exam  Vitals and nursing note reviewed.   Constitutional:       General: She is not in acute distress.     Appearance: Normal appearance. She is not toxic-appearing.   HENT:      Head: Normocephalic and atraumatic.      Right Ear: Tympanic membrane and ear canal normal.      Left Ear: Tympanic membrane normal.      Nose: Nose normal.      Mouth/Throat:      Mouth: Mucous membranes are moist.      Pharynx: Oropharynx is clear.   Eyes:      Extraocular Movements: Extraocular movements intact.      Conjunctiva/sclera: Conjunctivae normal.      Pupils: Pupils are equal, round, and reactive to light.   Cardiovascular:      Rate and Rhythm: Normal rate and regular rhythm.      Pulses: Normal pulses.      Heart sounds: Normal heart sounds.      Comments: Patient has generalized puffiness to the ankles there is no pitting edema.  Pulmonary:      Effort: Pulmonary effort is normal. No respiratory distress.      Breath sounds: Normal breath sounds. No stridor. No wheezing or rhonchi.   Abdominal:      General: Abdomen is flat. Bowel sounds are normal. There is no distension.      Palpations: Abdomen is soft. There is no mass.      Tenderness: There is abdominal tenderness (Epigastric tender).   Musculoskeletal:         General: Normal range of motion.      Cervical back: Normal range of motion and neck supple.   Skin:     General: Skin is warm.       Capillary Refill: Capillary refill takes less than 2 seconds.   Neurological:      General: No focal deficit present.      Mental Status: She is alert and oriented to person, place, and time. Mental status is at baseline.         Procedures           ED Course  ED Course as of 07/22/25 2116 Tue Jul 22, 2025 1924 CBC shows a WBC count of 13.8, hemoglobin stable at 13    hCG urine is negative    Urinalysis shows trace protein trace leukocytes negative for blood negative for nitrite [WF]   1924 Orthostatic vital signs did show an increase in patient's heart rate from sitting to standing. [WF]   1945 CMP shows a glucose of 104 BUN and creatinine are normal sodium potassium are normal    Magnesium elevated at 3.2 [WF]   1951 proBNP less than 36 high-sensitivity troponin less than 6 [WF]      ED Course User Index  [WF] Adrian Traylor Jr., APRN                                           Medical Decision Making  Differential diagnosis would include STEMI, non-STEMI, cardiac arrhythmia, dehydration, orthostatic hypotension, syncope of unknown origin, gastritis, GERD    Patient is nontoxic in appearance her vital signs are stable with the exception that her heart rate is 114.  Her mucous membranes are dry.  Will move forward and check full lab panel including CBC CMP troponin BNP chest x-ray EKG will check orthostatic vitals will give 1 L normal saline over 2 hours and give Pepcid for GI discomfort    I have reassessed the patient and she is resting comfortably.  Her magnesium is elevated at 3.2.  Patient reports that she eats Tums a lot for her stomach irritation.  Patient reports that she is currently taking Protonix.  Will add Carafate.    Patient's lungs are clear to auscultation.  Her chemistry is unremarkable she has normal kidney function troponin is unremarkable as well as BNP.    Patient is agreeable to discharge pending IV fluid infusion    Patient has received 1 L normal saline her heart rate is down to 100.   She is not having any epigastric pain or chest pain.  I have advised her to stop taking Tums for symptoms of GERD and to continue with Protonix.  I have added Carafate.  I am recommending patient follow-up with your PCP within the next 5 to 7 days to have magnesium level rechecked and she verbalizes agreement and understanding.    Second troponin is negative patient is agreeable to discharge at this time in the care of family    Will discharge home with Carafate a short course of HCTZ to help with peripheral edema I am recommending patient follow-up with her PCP to discuss diuretic pill.    Problems Addressed:  Dehydration: complicated acute illness or injury  Hypermagnesemia: complicated acute illness or injury  Near syncope: complicated acute illness or injury    Amount and/or Complexity of Data Reviewed  Labs: ordered.  ECG/medicine tests: ordered.    Risk  Prescription drug management.        Final diagnoses:   Near syncope   Dehydration   Hypermagnesemia       ED Disposition  ED Disposition       ED Disposition   Discharge    Condition   Stable    Comment   --               René Garcia MD  [address] [place] [id number] [phone]               Medication List        New Prescriptions      hydroCHLOROthiazide 12.5 MG tablet  Take 1 tablet by mouth Daily for 5 days.     sucralfate 1 GM/10ML suspension  Commonly known as: CARAFATE  Take 10 mL by mouth 2 (Two) Times a Day for 7 days.               Where to Get Your Medications        These medications were sent to [id number] - [place], [place] - [address] [place] [address] [address]  - [phone]  - [phone] [address] [place] [address]      Phone: [phone]   hydroCHLOROthiazide 12.5 MG tablet  sucralfate 1 GM/10ML suspension

## 2025-07-23 NOTE — DISCHARGE INSTRUCTIONS
Stop taking Tums daily for symptoms of reflux disease and continue with proton    Take Carafate as needed for symptoms of GERD gastritis    Increase your fluid intake with water    If you continue to have weight gain take HCTZ diuretic to help with management of fluid retention, recommend you follow-up with your family doctor within the week to talk about previous Bumex medication administration    Return to ER for worsening symptom

## 2025-07-25 LAB
QT INTERVAL: 328 MS
QTC INTERVAL: 450 MS

## 2025-07-25 PROCEDURE — 93005 ELECTROCARDIOGRAM TRACING: CPT
